# Patient Record
Sex: MALE | Race: BLACK OR AFRICAN AMERICAN | Employment: FULL TIME | URBAN - METROPOLITAN AREA
[De-identification: names, ages, dates, MRNs, and addresses within clinical notes are randomized per-mention and may not be internally consistent; named-entity substitution may affect disease eponyms.]

---

## 2017-05-18 ENCOUNTER — TELEPHONE (OUTPATIENT)
Dept: GASTROENTEROLOGY | Facility: CLINIC | Age: 52
End: 2017-05-18

## 2017-05-18 ENCOUNTER — OFFICE VISIT (OUTPATIENT)
Dept: GASTROENTEROLOGY | Facility: CLINIC | Age: 52
End: 2017-05-18

## 2017-05-18 VITALS
DIASTOLIC BLOOD PRESSURE: 82 MMHG | RESPIRATION RATE: 16 BRPM | HEIGHT: 74 IN | WEIGHT: 260 LBS | TEMPERATURE: 99 F | HEART RATE: 67 BPM | BODY MASS INDEX: 33.37 KG/M2 | SYSTOLIC BLOOD PRESSURE: 130 MMHG

## 2017-05-18 DIAGNOSIS — Z12.11 COLON CANCER SCREENING: Primary | ICD-10-CM

## 2017-05-18 PROCEDURE — 99213 OFFICE O/P EST LOW 20 MIN: CPT | Performed by: INTERNAL MEDICINE

## 2017-05-18 PROCEDURE — 99212 OFFICE O/P EST SF 10 MIN: CPT | Performed by: INTERNAL MEDICINE

## 2017-05-18 NOTE — TELEPHONE ENCOUNTER
Scheduled for:  Colonoscopy 08047  Provider Name:  Date:  6/24/17  Location:  [facility]  Sedation:  Iv Sedation  Time:  0900Am Will Hurry 0800 Am  Prep:Suprep  Meds/Allergies Reconciled?:  Nkda  Diagnosis with codes:  Colon Cancer Screening Z12.11  Was pat

## 2017-05-18 NOTE — PROGRESS NOTES
Paulette Tay is a 45year old male. HPI:   Patient presents with:  Colonoscopy Screening: Patient present for colonoscopy consult. Patient denies any GI problems or pain. Denies family history of colon cancer.      The patient is a 49-year-old male who p murmur or gallop  Abdomen- Soft and nontender, nondistended, no scars, no organosplenomegly  Ext- no clubbing or cyanosis  Skin- no rashes or lesions  Neuro- appropriate response, alert, no confusion  .   ASSESSMENT/PLAN:   Assessment  Colon screening    Th

## 2017-06-10 ENCOUNTER — OFFICE VISIT (OUTPATIENT)
Dept: FAMILY MEDICINE CLINIC | Facility: CLINIC | Age: 52
End: 2017-06-10

## 2017-06-10 VITALS
RESPIRATION RATE: 18 BRPM | SYSTOLIC BLOOD PRESSURE: 119 MMHG | HEART RATE: 51 BPM | DIASTOLIC BLOOD PRESSURE: 68 MMHG | BODY MASS INDEX: 34 KG/M2 | TEMPERATURE: 98 F | WEIGHT: 261 LBS

## 2017-06-10 DIAGNOSIS — Z00.00 ROUTINE GENERAL MEDICAL EXAMINATION AT A HEALTH CARE FACILITY: ICD-10-CM

## 2017-06-10 DIAGNOSIS — I10 ESSENTIAL HYPERTENSION WITH GOAL BLOOD PRESSURE LESS THAN 130/85: Primary | ICD-10-CM

## 2017-06-10 PROCEDURE — 99396 PREV VISIT EST AGE 40-64: CPT | Performed by: FAMILY MEDICINE

## 2017-06-10 RX ORDER — LISINOPRIL 30 MG/1
30 TABLET ORAL DAILY
Qty: 30 TABLET | Refills: 5 | Status: SHIPPED | OUTPATIENT
Start: 2017-06-10 | End: 2017-12-14

## 2017-06-10 NOTE — PROGRESS NOTES
HPI:    Patient ID: Jessa Blackmon is a 45year old male. HPI  Patient presents with:  Hypertension    Review of Systems   Constitutional: Positive for fatigue. Respiratory: Negative. Cardiovascular: Negative.              Current Outpatient Prescript

## 2017-06-14 ENCOUNTER — LAB ENCOUNTER (OUTPATIENT)
Dept: LAB | Age: 52
End: 2017-06-14
Attending: FAMILY MEDICINE
Payer: COMMERCIAL

## 2017-06-14 DIAGNOSIS — I10 ESSENTIAL HYPERTENSION WITH GOAL BLOOD PRESSURE LESS THAN 130/85: ICD-10-CM

## 2017-06-14 PROCEDURE — 36415 COLL VENOUS BLD VENIPUNCTURE: CPT

## 2017-06-14 PROCEDURE — 80061 LIPID PANEL: CPT

## 2017-06-14 PROCEDURE — 80053 COMPREHEN METABOLIC PANEL: CPT

## 2017-06-14 PROCEDURE — 85025 COMPLETE CBC W/AUTO DIFF WBC: CPT

## 2017-06-24 ENCOUNTER — HOSPITAL ENCOUNTER (OUTPATIENT)
Facility: HOSPITAL | Age: 52
Setting detail: HOSPITAL OUTPATIENT SURGERY
Discharge: HOME | End: 2017-06-24
Attending: INTERNAL MEDICINE | Admitting: INTERNAL MEDICINE
Payer: COMMERCIAL

## 2017-06-24 ENCOUNTER — SURGERY (OUTPATIENT)
Age: 52
End: 2017-06-24

## 2017-06-24 VITALS
HEART RATE: 47 BPM | DIASTOLIC BLOOD PRESSURE: 67 MMHG | RESPIRATION RATE: 17 BRPM | OXYGEN SATURATION: 99 % | BODY MASS INDEX: 33.37 KG/M2 | SYSTOLIC BLOOD PRESSURE: 120 MMHG | HEIGHT: 74 IN | WEIGHT: 260 LBS

## 2017-06-24 PROCEDURE — 0DJD8ZZ INSPECTION OF LOWER INTESTINAL TRACT, VIA NATURAL OR ARTIFICIAL OPENING ENDOSCOPIC: ICD-10-PCS | Performed by: INTERNAL MEDICINE

## 2017-06-24 PROCEDURE — 99152 MOD SED SAME PHYS/QHP 5/>YRS: CPT | Performed by: INTERNAL MEDICINE

## 2017-06-24 PROCEDURE — 45378 DIAGNOSTIC COLONOSCOPY: CPT | Performed by: INTERNAL MEDICINE

## 2017-06-24 RX ORDER — MIDAZOLAM HYDROCHLORIDE 1 MG/ML
INJECTION INTRAMUSCULAR; INTRAVENOUS
Status: DISCONTINUED | OUTPATIENT
Start: 2017-06-24 | End: 2017-06-26

## 2017-06-24 RX ORDER — SODIUM CHLORIDE 0.9 % (FLUSH) 0.9 %
10 SYRINGE (ML) INJECTION AS NEEDED
Status: DISCONTINUED | OUTPATIENT
Start: 2017-06-24 | End: 2017-06-26

## 2017-06-24 RX ORDER — SODIUM CHLORIDE, SODIUM LACTATE, POTASSIUM CHLORIDE, CALCIUM CHLORIDE 600; 310; 30; 20 MG/100ML; MG/100ML; MG/100ML; MG/100ML
INJECTION, SOLUTION INTRAVENOUS CONTINUOUS
Status: DISCONTINUED | OUTPATIENT
Start: 2017-06-24 | End: 2017-06-26

## 2017-06-24 RX ORDER — MIDAZOLAM HYDROCHLORIDE 1 MG/ML
1 INJECTION INTRAMUSCULAR; INTRAVENOUS EVERY 5 MIN PRN
Status: DISCONTINUED | OUTPATIENT
Start: 2017-06-24 | End: 2017-06-26

## 2017-06-24 NOTE — H&P
History & Physical Examination    Patient Name: Carol Gutierrez  MRN: [id number]  [facility]: [id number]  YOB: 1965    Diagnosis: colon screening        Prescriptions Prior to Admission:  lisinopril 30 MG Oral Tab Take 1 tablet (30 mg total) by mouth d

## 2017-06-24 NOTE — OPERATIVE REPORT
[facility] HOSP - [facility] Endoscopy Report      Preoperative Diagnosis:  - colon cancer screening      Postoperative Diagnosis:  - diverticulosis  - internal hemorrhoids      Procedure:    Colonoscopy       Surgeon:  Cherrie Coffman M.D.     Anesthesia:  Fe

## 2017-08-23 ENCOUNTER — OFFICE VISIT (OUTPATIENT)
Dept: SURGERY | Facility: CLINIC | Age: 52
End: 2017-08-23

## 2017-08-23 VITALS
HEIGHT: 74 IN | SYSTOLIC BLOOD PRESSURE: 143 MMHG | HEART RATE: 55 BPM | DIASTOLIC BLOOD PRESSURE: 87 MMHG | BODY MASS INDEX: 33.37 KG/M2 | TEMPERATURE: 98 F | WEIGHT: 260 LBS

## 2017-08-23 DIAGNOSIS — R97.20 ELEVATED PSA: Primary | ICD-10-CM

## 2017-08-23 PROCEDURE — 99244 OFF/OP CNSLTJ NEW/EST MOD 40: CPT | Performed by: UROLOGY

## 2017-08-23 PROCEDURE — 99212 OFFICE O/P EST SF 10 MIN: CPT | Performed by: UROLOGY

## 2017-08-23 NOTE — PROGRESS NOTES
SUBJECTIVE:  Genaro Fleischer is a 45year old male who presents for a consultation at the request of, and a copy of this note will be sent to, Dr. Faith Shea, for evaluation of  elevated PSA. He states that the problem is unchanged.  Symptoms include noted to hav 98.3 °F (36.8 °C) (Oral)   Ht 6' 2\" (1.88 m)   Wt 260 lb (117.9 kg)   BMI 33.38 kg/m² no chest wall deformities or tenderness  He appears well, in no apparent distress. Alert and oriented times three, pleasant and cooperative.   CARDIOVASCULAR:normal apic

## 2017-12-14 ENCOUNTER — OFFICE VISIT (OUTPATIENT)
Dept: FAMILY MEDICINE CLINIC | Facility: CLINIC | Age: 52
End: 2017-12-14

## 2017-12-14 VITALS
SYSTOLIC BLOOD PRESSURE: 124 MMHG | DIASTOLIC BLOOD PRESSURE: 71 MMHG | BODY MASS INDEX: 34 KG/M2 | HEART RATE: 49 BPM | WEIGHT: 267 LBS

## 2017-12-14 DIAGNOSIS — I10 ESSENTIAL HYPERTENSION WITH GOAL BLOOD PRESSURE LESS THAN 130/85: Primary | ICD-10-CM

## 2017-12-14 PROCEDURE — 99212 OFFICE O/P EST SF 10 MIN: CPT | Performed by: FAMILY MEDICINE

## 2017-12-14 PROCEDURE — 99213 OFFICE O/P EST LOW 20 MIN: CPT | Performed by: FAMILY MEDICINE

## 2017-12-14 RX ORDER — LISINOPRIL 30 MG/1
30 TABLET ORAL DAILY
Qty: 30 TABLET | Refills: 6 | Status: SHIPPED | OUTPATIENT
Start: 2017-12-14 | End: 2018-06-14

## 2017-12-14 RX ORDER — LISINOPRIL 30 MG/1
TABLET ORAL
Qty: 30 TABLET | Refills: 0 | Status: CANCELLED | OUTPATIENT
Start: 2017-12-14

## 2017-12-14 NOTE — PROGRESS NOTES
HPI:    Patient ID: Genaro Fleischer is a 45year old male. HPI  Patient presents with:  Hypertension: 6 month f/u on medication    Review of Systems   Constitutional: Negative. Respiratory: Negative. Cardiovascular: Negative.     Neurological: Negativ

## 2018-06-14 ENCOUNTER — OFFICE VISIT (OUTPATIENT)
Dept: FAMILY MEDICINE CLINIC | Facility: CLINIC | Age: 53
End: 2018-06-14

## 2018-06-14 VITALS
DIASTOLIC BLOOD PRESSURE: 81 MMHG | SYSTOLIC BLOOD PRESSURE: 144 MMHG | BODY MASS INDEX: 33 KG/M2 | HEART RATE: 58 BPM | WEIGHT: 256 LBS

## 2018-06-14 DIAGNOSIS — I10 ESSENTIAL HYPERTENSION WITH GOAL BLOOD PRESSURE LESS THAN 130/85: Primary | ICD-10-CM

## 2018-06-14 DIAGNOSIS — Z12.5 PROSTATE CANCER SCREENING: ICD-10-CM

## 2018-06-14 PROCEDURE — 99214 OFFICE O/P EST MOD 30 MIN: CPT | Performed by: FAMILY MEDICINE

## 2018-06-14 PROCEDURE — 99212 OFFICE O/P EST SF 10 MIN: CPT | Performed by: FAMILY MEDICINE

## 2018-06-14 RX ORDER — PENICILLIN V POTASSIUM 500 MG/1
TABLET ORAL
Refills: 0 | COMMUNITY
Start: 2018-06-05 | End: 2018-10-01

## 2018-06-14 RX ORDER — LISINOPRIL 30 MG/1
30 TABLET ORAL DAILY
Qty: 30 TABLET | Refills: 6 | Status: SHIPPED | OUTPATIENT
Start: 2018-06-14 | End: 2019-01-19

## 2018-06-14 NOTE — PROGRESS NOTES
HPI:    Patient ID: Bekah Wallace is a 52year old male. HPI  Patient presents with:  Hypertension: 6 month f/u on medication and refills    Review of Systems   Constitutional: Negative. Respiratory: Negative. Cardiovascular: Negative.     [place]

## 2018-07-24 ENCOUNTER — LAB ENCOUNTER (OUTPATIENT)
Dept: LAB | Age: 53
End: 2018-07-24
Attending: FAMILY MEDICINE
Payer: COMMERCIAL

## 2018-07-24 DIAGNOSIS — I10 ESSENTIAL HYPERTENSION WITH GOAL BLOOD PRESSURE LESS THAN 130/85: ICD-10-CM

## 2018-07-24 DIAGNOSIS — Z12.5 PROSTATE CANCER SCREENING: ICD-10-CM

## 2018-07-24 DIAGNOSIS — R97.20 ELEVATED PSA: ICD-10-CM

## 2018-07-24 LAB
ALBUMIN SERPL BCP-MCNC: 4 G/DL (ref 3.5–4.8)
ALBUMIN/GLOB SERPL: 1.2 {RATIO} (ref 1–2)
ALP SERPL-CCNC: 39 U/L (ref 32–100)
ALT SERPL-CCNC: 18 U/L (ref 17–63)
ANION GAP SERPL CALC-SCNC: 9 MMOL/L (ref 0–18)
AST SERPL-CCNC: 40 U/L (ref 15–41)
BACTERIA UR QL AUTO: NEGATIVE /HPF
BASOPHILS # BLD: 0 K/UL (ref 0–0.2)
BASOPHILS NFR BLD: 0 %
BILIRUB SERPL-MCNC: 1.4 MG/DL (ref 0.3–1.2)
BILIRUB UR QL: NEGATIVE
BUN SERPL-MCNC: 18 MG/DL (ref 8–20)
BUN/CREAT SERPL: 12.7 (ref 10–20)
CALCIUM SERPL-MCNC: 9.8 MG/DL (ref 8.5–10.5)
CHLORIDE SERPL-SCNC: 106 MMOL/L (ref 95–110)
CHOLEST SERPL-MCNC: 201 MG/DL (ref 110–200)
CLARITY UR: CLEAR
CO2 SERPL-SCNC: 25 MMOL/L (ref 22–32)
COLOR UR: YELLOW
CREAT SERPL-MCNC: 1.42 MG/DL (ref 0.5–1.5)
EOSINOPHIL # BLD: 0 K/UL (ref 0–0.7)
EOSINOPHIL NFR BLD: 1 %
ERYTHROCYTE [DISTWIDTH] IN BLOOD BY AUTOMATED COUNT: 13.6 % (ref 11–15)
GLOBULIN PLAS-MCNC: 3.3 G/DL (ref 2.5–3.7)
GLUCOSE SERPL-MCNC: 80 MG/DL (ref 70–99)
GLUCOSE UR-MCNC: NEGATIVE MG/DL
HCT VFR BLD AUTO: 41.6 % (ref 41–52)
HDLC SERPL-MCNC: 53 MG/DL
HGB BLD-MCNC: 13.2 G/DL (ref 13.5–17.5)
HGB UR QL STRIP.AUTO: NEGATIVE
KETONES UR-MCNC: NEGATIVE MG/DL
LDLC SERPL CALC-MCNC: 125 MG/DL (ref 0–99)
LEUKOCYTE ESTERASE UR QL STRIP.AUTO: NEGATIVE
LYMPHOCYTES # BLD: 1.5 K/UL (ref 1–4)
LYMPHOCYTES NFR BLD: 40 %
MCH RBC QN AUTO: 28.1 PG (ref 27–32)
MCHC RBC AUTO-ENTMCNC: 31.9 G/DL (ref 32–37)
MCV RBC AUTO: 88.3 FL (ref 80–100)
MONOCYTES # BLD: 0.2 K/UL (ref 0–1)
MONOCYTES NFR BLD: 6 %
NEUTROPHILS # BLD AUTO: 2 K/UL (ref 1.8–7.7)
NEUTROPHILS NFR BLD: 54 %
NITRITE UR QL STRIP.AUTO: NEGATIVE
NONHDLC SERPL-MCNC: 148 MG/DL
OSMOLALITY UR CALC.SUM OF ELEC: 291 MOSM/KG (ref 275–295)
PATIENT FASTING: YES
PH UR: 5 [PH] (ref 5–8)
PLATELET # BLD AUTO: 192 K/UL (ref 140–400)
PMV BLD AUTO: 9.5 FL (ref 7.4–10.3)
POTASSIUM SERPL-SCNC: 4.4 MMOL/L (ref 3.3–5.1)
PROT SERPL-MCNC: 7.3 G/DL (ref 5.9–8.4)
PROT UR-MCNC: 30 MG/DL
PSA SERPL-MCNC: 4 NG/ML (ref 0–4)
PSA SERPL-MCNC: 4 NG/ML (ref 0–4)
RBC # BLD AUTO: 4.71 M/UL (ref 4.5–5.9)
RBC #/AREA URNS AUTO: 2 /HPF
SODIUM SERPL-SCNC: 140 MMOL/L (ref 136–144)
SP GR UR STRIP: 1.02 (ref 1–1.03)
TRIGL SERPL-MCNC: 116 MG/DL (ref 1–149)
UROBILINOGEN UR STRIP-ACNC: 2
VIT C UR-MCNC: 40 MG/DL
WBC # BLD AUTO: 3.7 K/UL (ref 4–11)
WBC #/AREA URNS AUTO: <1 /HPF

## 2018-07-24 PROCEDURE — 85025 COMPLETE CBC W/AUTO DIFF WBC: CPT

## 2018-07-24 PROCEDURE — 80061 LIPID PANEL: CPT

## 2018-07-24 PROCEDURE — 36415 COLL VENOUS BLD VENIPUNCTURE: CPT

## 2018-07-24 PROCEDURE — 81001 URINALYSIS AUTO W/SCOPE: CPT

## 2018-07-24 PROCEDURE — 84153 ASSAY OF PSA TOTAL: CPT

## 2018-07-24 PROCEDURE — 80053 COMPREHEN METABOLIC PANEL: CPT

## 2018-10-01 ENCOUNTER — HOSPITAL ENCOUNTER (OUTPATIENT)
Age: 53
Discharge: HOME | End: 2018-10-01
Payer: COMMERCIAL

## 2018-10-01 ENCOUNTER — APPOINTMENT (OUTPATIENT)
Dept: GENERAL RADIOLOGY | Age: 53
End: 2018-10-01
Attending: NURSE PRACTITIONER
Payer: COMMERCIAL

## 2018-10-01 VITALS
TEMPERATURE: 99 F | HEART RATE: 63 BPM | BODY MASS INDEX: 33 KG/M2 | OXYGEN SATURATION: 98 % | WEIGHT: 260 LBS | SYSTOLIC BLOOD PRESSURE: 156 MMHG | RESPIRATION RATE: 20 BRPM | DIASTOLIC BLOOD PRESSURE: 97 MMHG

## 2018-10-01 DIAGNOSIS — L03.90 CELLULITIS, UNSPECIFIED CELLULITIS SITE: Primary | ICD-10-CM

## 2018-10-01 PROCEDURE — 99213 OFFICE O/P EST LOW 20 MIN: CPT

## 2018-10-01 PROCEDURE — 73660 X-RAY EXAM OF TOE(S): CPT | Performed by: NURSE PRACTITIONER

## 2018-10-01 PROCEDURE — 99204 OFFICE O/P NEW MOD 45 MIN: CPT

## 2018-10-01 RX ORDER — CEPHALEXIN 500 MG/1
500 CAPSULE ORAL 4 TIMES DAILY
Qty: 40 CAPSULE | Refills: 0 | Status: SHIPPED | OUTPATIENT
Start: 2018-10-01 | End: 2018-10-11

## 2018-10-01 NOTE — ED INITIAL ASSESSMENT (HPI)
Left foot pain and swelling for one week on the medial side of his foot by the great toe.  +swelling  +redness  +pain    Pt is a  and wears steel toe shoes while working.

## 2018-10-01 NOTE — ED PROVIDER NOTES
Patient presents with: Foot Pain: pt complaining of left foot pain and swelling      HPI:     Jessa Blackmon is a 52year old male with a past history of hypertension presents with left medial great toe pain over the course the last week.   Patient reports h metatarsal phalangeal joint. Moderate soft tissue swelling medial to the left first metatarsal phalangeal joint. Correlate clinically. Can't exclude an  inflammatory process.      Dictated by (CST): Sara Blackburn MD on 10/01/2018 at 12:47     Approved by (

## 2018-10-03 ENCOUNTER — OFFICE VISIT (OUTPATIENT)
Dept: FAMILY MEDICINE CLINIC | Facility: CLINIC | Age: 53
End: 2018-10-03
Payer: COMMERCIAL

## 2018-10-03 VITALS
HEIGHT: 74 IN | DIASTOLIC BLOOD PRESSURE: 98 MMHG | HEART RATE: 46 BPM | WEIGHT: 260 LBS | TEMPERATURE: 99 F | BODY MASS INDEX: 33.37 KG/M2 | SYSTOLIC BLOOD PRESSURE: 156 MMHG

## 2018-10-03 DIAGNOSIS — M25.579 ARTHRALGIA OF FOOT, UNSPECIFIED LATERALITY: Primary | ICD-10-CM

## 2018-10-03 PROCEDURE — 99212 OFFICE O/P EST SF 10 MIN: CPT | Performed by: FAMILY MEDICINE

## 2018-10-03 PROCEDURE — 99213 OFFICE O/P EST LOW 20 MIN: CPT | Performed by: FAMILY MEDICINE

## 2018-10-04 ENCOUNTER — TELEPHONE (OUTPATIENT)
Dept: FAMILY MEDICINE CLINIC | Facility: CLINIC | Age: 53
End: 2018-10-04

## 2018-10-04 ENCOUNTER — OFFICE VISIT (OUTPATIENT)
Dept: FAMILY MEDICINE CLINIC | Facility: CLINIC | Age: 53
End: 2018-10-04
Payer: COMMERCIAL

## 2018-10-04 VITALS
WEIGHT: 260 LBS | BODY MASS INDEX: 33.37 KG/M2 | SYSTOLIC BLOOD PRESSURE: 158 MMHG | HEIGHT: 74 IN | HEART RATE: 60 BPM | DIASTOLIC BLOOD PRESSURE: 84 MMHG | TEMPERATURE: 98 F

## 2018-10-04 DIAGNOSIS — L03.119 CELLULITIS AND ABSCESS OF FOOT: Primary | ICD-10-CM

## 2018-10-04 DIAGNOSIS — L02.619 CELLULITIS AND ABSCESS OF FOOT: Primary | ICD-10-CM

## 2018-10-04 PROCEDURE — 99214 OFFICE O/P EST MOD 30 MIN: CPT | Performed by: FAMILY MEDICINE

## 2018-10-04 PROCEDURE — 99212 OFFICE O/P EST SF 10 MIN: CPT | Performed by: FAMILY MEDICINE

## 2018-10-04 NOTE — PROGRESS NOTES
HPI:    Patient ID: Elizabet Lua is a 52year old male. Here for f/u UC. Was started with keflex for presumed cellulitis. Started with severe pain in the 1st MTP joint around a week ago. No fever. Pain was severe now much better.  Does not remember t

## 2018-10-04 NOTE — PROGRESS NOTES
HPI:    Patient ID: Xenia Dozier is a 52year old male. HPI  Patient presents with:  Complete Form: FMLA  left foot cellulitis holding him off work during treatment . Painful. Review of Systems   Constitutional: Negative.     Musculoskeletal: Positiv

## 2018-10-06 NOTE — TELEPHONE ENCOUNTER
FMLA form for Dr. Rosalie Avendaño received in [employer] Signed release, pt paid $25 with . Logged for processing.  NK

## 2018-11-23 NOTE — TELEPHONE ENCOUNTER
Left message 10/24, 11/22 for patient who has not returned my call . Need specific dates he was off before I can complete.  SC

## 2019-01-19 RX ORDER — LISINOPRIL 30 MG/1
TABLET ORAL
Qty: 30 TABLET | Refills: 0 | Status: SHIPPED | OUTPATIENT
Start: 2019-01-19 | End: 2019-02-15

## 2019-02-15 RX ORDER — LISINOPRIL 30 MG/1
TABLET ORAL
Qty: 30 TABLET | Refills: 2 | Status: SHIPPED | OUTPATIENT
Start: 2019-02-15 | End: 2019-05-24

## 2019-05-24 ENCOUNTER — OFFICE VISIT (OUTPATIENT)
Dept: FAMILY MEDICINE CLINIC | Facility: CLINIC | Age: 54
End: 2019-05-24
Payer: COMMERCIAL

## 2019-05-24 VITALS
DIASTOLIC BLOOD PRESSURE: 70 MMHG | HEART RATE: 61 BPM | HEIGHT: 74 IN | WEIGHT: 267 LBS | SYSTOLIC BLOOD PRESSURE: 118 MMHG | BODY MASS INDEX: 34.27 KG/M2 | TEMPERATURE: 98 F

## 2019-05-24 DIAGNOSIS — Z00.00 ROUTINE GENERAL MEDICAL EXAMINATION AT A HEALTH CARE FACILITY: Primary | ICD-10-CM

## 2019-05-24 DIAGNOSIS — R97.20 ELEVATED PSA: ICD-10-CM

## 2019-05-24 PROCEDURE — 99396 PREV VISIT EST AGE 40-64: CPT | Performed by: FAMILY MEDICINE

## 2019-05-24 RX ORDER — LISINOPRIL 30 MG/1
TABLET ORAL
Qty: 30 TABLET | Refills: 11 | Status: SHIPPED | OUTPATIENT
Start: 2019-05-24 | End: 2020-05-12

## 2019-05-24 RX ORDER — ALFUZOSIN HYDROCHLORIDE 10 MG/1
10 TABLET, EXTENDED RELEASE ORAL DAILY
Qty: 30 TABLET | Refills: 5 | Status: SHIPPED | OUTPATIENT
Start: 2019-05-24 | End: 2019-11-25

## 2019-05-24 NOTE — PROGRESS NOTES
HPI:    Patient ID: Ron Dawn is a 62year old male.     HPI  Patient presents with:  Physical: annual physical     Past Medical History:   Diagnosis Date   • High blood pressure    • Unspecified essential hypertension      Review of Systems   Constituti Radial pulses are 2+ on the right side, and 2+ on the left side. Pulmonary/Chest: Breath sounds normal.   Abdominal: Normal appearance. There is no hepatosplenomegaly. There is no tenderness. There is no CVA tenderness.    Lymphadenopathy:     He has no

## 2019-09-12 ENCOUNTER — LAB ENCOUNTER (OUTPATIENT)
Dept: LAB | Age: 54
End: 2019-09-12
Attending: FAMILY MEDICINE
Payer: COMMERCIAL

## 2019-09-12 DIAGNOSIS — Z00.00 ROUTINE GENERAL MEDICAL EXAMINATION AT A HEALTH CARE FACILITY: ICD-10-CM

## 2019-09-12 LAB
ALBUMIN SERPL-MCNC: 3.5 G/DL (ref 3.4–5)
ALBUMIN/GLOB SERPL: 0.9 {RATIO} (ref 1–2)
ALP LIVER SERPL-CCNC: 47 U/L (ref 45–117)
ALT SERPL-CCNC: 16 U/L (ref 16–61)
ANION GAP SERPL CALC-SCNC: 4 MMOL/L (ref 0–18)
AST SERPL-CCNC: 19 U/L (ref 15–37)
BASOPHILS # BLD AUTO: 0.02 X10(3) UL (ref 0–0.2)
BASOPHILS NFR BLD AUTO: 0.5 %
BILIRUB SERPL-MCNC: 0.7 MG/DL (ref 0.1–2)
BUN BLD-MCNC: 25 MG/DL (ref 7–18)
BUN/CREAT SERPL: 17.2 (ref 10–20)
CALCIUM BLD-MCNC: 9.2 MG/DL (ref 8.5–10.1)
CHLORIDE SERPL-SCNC: 112 MMOL/L (ref 98–112)
CHOLEST SMN-MCNC: 185 MG/DL (ref ?–200)
CO2 SERPL-SCNC: 29 MMOL/L (ref 21–32)
COMPLEXED PSA SERPL-MCNC: 7.32 NG/ML (ref ?–4)
CREAT BLD-MCNC: 1.45 MG/DL (ref 0.7–1.3)
DEPRECATED RDW RBC AUTO: 42.2 FL (ref 35.1–46.3)
EOSINOPHIL # BLD AUTO: 0.01 X10(3) UL (ref 0–0.7)
EOSINOPHIL NFR BLD AUTO: 0.3 %
ERYTHROCYTE [DISTWIDTH] IN BLOOD BY AUTOMATED COUNT: 12.7 % (ref 11–15)
GLOBULIN PLAS-MCNC: 4 G/DL (ref 2.8–4.4)
GLUCOSE BLD-MCNC: 97 MG/DL (ref 70–99)
HCT VFR BLD AUTO: 43.8 % (ref 39–53)
HDLC SERPL-MCNC: 55 MG/DL (ref 40–59)
HGB BLD-MCNC: 13.4 G/DL (ref 13–17.5)
IMM GRANULOCYTES # BLD AUTO: 0.01 X10(3) UL (ref 0–1)
IMM GRANULOCYTES NFR BLD: 0.3 %
LDLC SERPL CALC-MCNC: 113 MG/DL (ref ?–100)
LYMPHOCYTES # BLD AUTO: 1.37 X10(3) UL (ref 1–4)
LYMPHOCYTES NFR BLD AUTO: 37.2 %
M PROTEIN MFR SERPL ELPH: 7.5 G/DL (ref 6.4–8.2)
MCH RBC QN AUTO: 27.8 PG (ref 26–34)
MCHC RBC AUTO-ENTMCNC: 30.6 G/DL (ref 31–37)
MCV RBC AUTO: 90.9 FL (ref 80–100)
MONOCYTES # BLD AUTO: 0.21 X10(3) UL (ref 0.1–1)
MONOCYTES NFR BLD AUTO: 5.7 %
NEUTROPHILS # BLD AUTO: 2.06 X10 (3) UL (ref 1.5–7.7)
NEUTROPHILS # BLD AUTO: 2.06 X10(3) UL (ref 1.5–7.7)
NEUTROPHILS NFR BLD AUTO: 56 %
NONHDLC SERPL-MCNC: 130 MG/DL (ref ?–130)
OSMOLALITY SERPL CALC.SUM OF ELEC: 304 MOSM/KG (ref 275–295)
PATIENT FASTING: YES
PATIENT FASTING: YES
PLATELET # BLD AUTO: 217 10(3)UL (ref 150–450)
POTASSIUM SERPL-SCNC: 4.8 MMOL/L (ref 3.5–5.1)
RBC # BLD AUTO: 4.82 X10(6)UL (ref 4.3–5.7)
SODIUM SERPL-SCNC: 145 MMOL/L (ref 136–145)
TRIGL SERPL-MCNC: 87 MG/DL (ref 30–149)
VLDLC SERPL CALC-MCNC: 17 MG/DL (ref 0–30)
WBC # BLD AUTO: 3.7 X10(3) UL (ref 4–11)

## 2019-09-12 PROCEDURE — 80053 COMPREHEN METABOLIC PANEL: CPT

## 2019-09-12 PROCEDURE — 36415 COLL VENOUS BLD VENIPUNCTURE: CPT

## 2019-09-12 PROCEDURE — 80061 LIPID PANEL: CPT

## 2019-09-12 PROCEDURE — 85025 COMPLETE CBC W/AUTO DIFF WBC: CPT

## 2019-09-23 DIAGNOSIS — Z00.00 ROUTINE GENERAL MEDICAL EXAMINATION AT A HEALTH CARE FACILITY: Primary | ICD-10-CM

## 2019-11-26 RX ORDER — ALFUZOSIN HYDROCHLORIDE 10 MG/1
TABLET, EXTENDED RELEASE ORAL
Qty: 90 TABLET | Refills: 1 | Status: SHIPPED | OUTPATIENT
Start: 2019-11-26 | End: 2020-05-12

## 2019-11-26 NOTE — TELEPHONE ENCOUNTER
Review pended refill request as it does not fall under a protocol.     Last Rx: 5/24/19  LOV: 6 months ago

## 2019-12-09 ENCOUNTER — OFFICE VISIT (OUTPATIENT)
Dept: SURGERY | Facility: CLINIC | Age: 54
End: 2019-12-09
Payer: COMMERCIAL

## 2019-12-09 ENCOUNTER — TELEPHONE (OUTPATIENT)
Dept: SURGERY | Facility: CLINIC | Age: 54
End: 2019-12-09

## 2019-12-09 VITALS
DIASTOLIC BLOOD PRESSURE: 75 MMHG | SYSTOLIC BLOOD PRESSURE: 123 MMHG | TEMPERATURE: 98 F | HEART RATE: 62 BPM | BODY MASS INDEX: 34 KG/M2 | WEIGHT: 262 LBS

## 2019-12-09 DIAGNOSIS — R97.20 ELEVATED PSA: Primary | ICD-10-CM

## 2019-12-09 PROCEDURE — 99214 OFFICE O/P EST MOD 30 MIN: CPT | Performed by: UROLOGY

## 2019-12-09 RX ORDER — CEFDINIR 300 MG/1
300 CAPSULE ORAL EVERY 12 HOURS
Qty: 6 CAPSULE | Refills: 0 | Status: SHIPPED | OUTPATIENT
Start: 2019-12-09 | End: 2019-12-12

## 2019-12-09 RX ORDER — CIPROFLOXACIN 500 MG/1
500 TABLET, FILM COATED ORAL 2 TIMES DAILY
Qty: 6 TABLET | Refills: 0 | Status: SHIPPED | OUTPATIENT
Start: 2019-12-09 | End: 2020-05-12 | Stop reason: ALTCHOICE

## 2019-12-09 NOTE — PROGRESS NOTES
Estefani Jones is a 62year old male. HPI:   Patient presents with:  elevated psa: PT presents for follow up to discuss elevated psa. PT denies new issues or complaints.        49-year-old male -American with chronic history of elevated serum PSA mo but definitely more firm than previously in 2017. ASSESSMENT/PLAN:   Assessment   Elevated psa  (primary encounter diagnosis)    Reviewed findings at length with patient.   I strongly recommended proceeding with transrectal ultrasound and prostate biops

## 2019-12-09 NOTE — TELEPHONE ENCOUNTER
Patient seen in office today. Scheduled patient for prostate biopsy. Went over prostate biopsy instructions, patient verbalized understanding. Patient signed instructions sheet. Copy sent to scanning.        Future Appointments   Date Time Provider Departme

## 2020-01-24 ENCOUNTER — OFFICE VISIT (OUTPATIENT)
Dept: SURGERY | Facility: CLINIC | Age: 55
End: 2020-01-24
Payer: COMMERCIAL

## 2020-01-24 VITALS
HEIGHT: 74 IN | DIASTOLIC BLOOD PRESSURE: 80 MMHG | HEART RATE: 52 BPM | WEIGHT: 259 LBS | TEMPERATURE: 99 F | RESPIRATION RATE: 16 BRPM | BODY MASS INDEX: 33.24 KG/M2 | SYSTOLIC BLOOD PRESSURE: 132 MMHG | OXYGEN SATURATION: 98 %

## 2020-01-24 DIAGNOSIS — R97.20 ELEVATED PSA: Primary | ICD-10-CM

## 2020-01-24 PROCEDURE — 55700 BIOPSY OF PROSTATE,NEEDLE/PUNCH: CPT | Performed by: UROLOGY

## 2020-01-24 PROCEDURE — 76942 ECHO GUIDE FOR BIOPSY: CPT | Performed by: UROLOGY

## 2020-01-24 NOTE — PROGRESS NOTES
Genaro Fleischer is a 60year old male. HPI:   Patient presents with:  elevated psa: transrectal ultrasound with prostate biopsies       57-year-old male presents for transrectal ultrasound and prostate biopsy.   He was last seen in the office December 9, 20 obtained: 12  Areas biopsied: Right and left apex, mid and bases at mid and lateral areas each.    Complications: None  Blood loss: Minimal         ASSESSMENT/PLAN:   Assessment   Elevated psa  (primary encounter diagnosis)    Follow-up in 2 weeks to review

## 2020-01-30 ENCOUNTER — TELEPHONE (OUTPATIENT)
Dept: SURGERY | Facility: CLINIC | Age: 55
End: 2020-01-30

## 2020-01-30 NOTE — TELEPHONE ENCOUNTER
Patient contacted. Patient is aware of his prostate biopsy results and verbalized understanding. Patient states he will f/u as planned.

## 2020-01-30 NOTE — TELEPHONE ENCOUNTER
----- Message from Aditya Sidhu MD sent at 1/29/2020 12:54 PM CST -----  Please call this patient and let him know that his prostate biopsy does not show any cancer. He should follow-up as scheduled.

## 2020-02-10 ENCOUNTER — OFFICE VISIT (OUTPATIENT)
Dept: SURGERY | Facility: CLINIC | Age: 55
End: 2020-02-10
Payer: COMMERCIAL

## 2020-02-10 VITALS
TEMPERATURE: 98 F | DIASTOLIC BLOOD PRESSURE: 78 MMHG | WEIGHT: 260 LBS | SYSTOLIC BLOOD PRESSURE: 125 MMHG | HEART RATE: 62 BPM | BODY MASS INDEX: 33 KG/M2

## 2020-02-10 DIAGNOSIS — R97.20 ELEVATED PSA: Primary | ICD-10-CM

## 2020-02-10 PROCEDURE — 99213 OFFICE O/P EST LOW 20 MIN: CPT | Performed by: UROLOGY

## 2020-02-11 ENCOUNTER — TELEPHONE (OUTPATIENT)
Dept: SURGERY | Facility: CLINIC | Age: 55
End: 2020-02-11

## 2020-02-11 NOTE — TELEPHONE ENCOUNTER
Left message on voicemail regarding follow-up change and to call office back for further explanation.

## 2020-02-11 NOTE — TELEPHONE ENCOUNTER
Staff I had seen this patient yesterday in follow-up to a prostate biopsy which was negative. I inadvertently told the patient to follow-up in 1 year.   Please call him and tell him I would like to see him instead September 2020 with a repeat PSA and digit

## 2020-02-11 NOTE — PROGRESS NOTES
Josias Juares is a 60year old male. HPI:   Patient presents with:  elevated psa: PT presents for follow up to discuss bx results.        42-year-old male status post transrectal ultrasound and prostate biopsy performed January 24, 2020 for an elevated PS atrophy. · No evidence of high-grade prostatic intraepithelial neoplasia (HGPIN) or malignancy identified.     F.  Prostate, left apex/left lateral apex; needle biopsy:                · Benign glandular and stromal prostatic tissue demonstrating focal mild results and he is aware. Stressed the importance of continued prostate cancer screening with yearly PSA and digital prostate exam.  Recommended he follow-up 9/2019 for a repeat PSA and digital prostate exam at that time.   Patient understands plan and agre

## 2020-05-06 ENCOUNTER — NURSE TRIAGE (OUTPATIENT)
Dept: FAMILY MEDICINE CLINIC | Facility: CLINIC | Age: 55
End: 2020-05-06

## 2020-05-06 NOTE — TELEPHONE ENCOUNTER
Action Requested: Summary for Provider     []  Critical Lab, Recommendations Needed  [x] Need Additional Advice  []   FYI    []   Need Orders  [] Need Medications Sent to Pharmacy  []  Other     SUMMARY:     Spoke with pt,  verified  Pt stated he is fernie

## 2020-05-12 ENCOUNTER — OFFICE VISIT (OUTPATIENT)
Dept: FAMILY MEDICINE CLINIC | Facility: CLINIC | Age: 55
End: 2020-05-12
Payer: COMMERCIAL

## 2020-05-12 VITALS
TEMPERATURE: 98 F | SYSTOLIC BLOOD PRESSURE: 119 MMHG | HEART RATE: 66 BPM | DIASTOLIC BLOOD PRESSURE: 70 MMHG | WEIGHT: 261 LBS | BODY MASS INDEX: 33.5 KG/M2 | HEIGHT: 74 IN

## 2020-05-12 DIAGNOSIS — R97.20 ELEVATED PSA: ICD-10-CM

## 2020-05-12 DIAGNOSIS — I10 ESSENTIAL HYPERTENSION WITH GOAL BLOOD PRESSURE LESS THAN 130/85: ICD-10-CM

## 2020-05-12 DIAGNOSIS — Z00.00 ROUTINE GENERAL MEDICAL EXAMINATION AT A HEALTH CARE FACILITY: Primary | ICD-10-CM

## 2020-05-12 PROCEDURE — 99396 PREV VISIT EST AGE 40-64: CPT | Performed by: FAMILY MEDICINE

## 2020-05-12 RX ORDER — LISINOPRIL 30 MG/1
TABLET ORAL
Qty: 90 TABLET | Refills: 3 | Status: SHIPPED | OUTPATIENT
Start: 2020-05-12 | End: 2021-06-05

## 2020-05-12 RX ORDER — ALFUZOSIN HYDROCHLORIDE 10 MG/1
10 TABLET, EXTENDED RELEASE ORAL DAILY
Qty: 90 TABLET | Refills: 1 | Status: SHIPPED | OUTPATIENT
Start: 2020-05-12 | End: 2020-11-23

## 2020-05-12 RX ORDER — TADALAFIL 10 MG/1
10 TABLET ORAL
Qty: 6 TABLET | Refills: 1 | Status: SHIPPED | OUTPATIENT
Start: 2020-05-12 | End: 2020-06-25

## 2020-05-12 NOTE — PROGRESS NOTES
HPI:    Patient ID: Xenia Dozier is a 60year old male.     HPI  Patient presents with:  Physical: annual routine physical,     Past Medical History:   Diagnosis Date   • High blood pressure    • Unspecified essential hypertension      Review of Systems   C and no JVD present. Cardiovascular: Regular rhythm and normal heart sounds. Pulses:       Carotid pulses are 2+ on the right side and 2+ on the left side. Radial pulses are 2+ on the right side and 2+ on the left side.    Pulmonary/Chest: Breath s

## 2020-06-02 RX ORDER — LISINOPRIL 30 MG/1
TABLET ORAL
Qty: 30 TABLET | Refills: 0 | OUTPATIENT
Start: 2020-06-02

## 2020-06-03 NOTE — TELEPHONE ENCOUNTER
Called pt LM in regards to med was given for 1 year, give clinic a call in regards to matter of requesting refill

## 2020-06-25 RX ORDER — TADALAFIL 10 MG/1
TABLET ORAL
Qty: 6 TABLET | Refills: 5 | Status: SHIPPED | OUTPATIENT
Start: 2020-06-25 | End: 2022-08-29

## 2020-09-03 ENCOUNTER — LAB ENCOUNTER (OUTPATIENT)
Dept: LAB | Age: 55
End: 2020-09-03
Attending: FAMILY MEDICINE
Payer: COMMERCIAL

## 2020-09-03 DIAGNOSIS — R97.20 ELEVATED PSA: ICD-10-CM

## 2020-09-03 LAB — PSA SERPL-MCNC: 13 NG/ML (ref ?–4)

## 2020-09-03 PROCEDURE — 84153 ASSAY OF PSA TOTAL: CPT

## 2020-09-03 PROCEDURE — 36415 COLL VENOUS BLD VENIPUNCTURE: CPT

## 2020-09-13 ENCOUNTER — TELEPHONE (OUTPATIENT)
Dept: SURGERY | Facility: CLINIC | Age: 55
End: 2020-09-13

## 2020-09-13 NOTE — TELEPHONE ENCOUNTER
GLENN on cell #. I also called pt at the [facility] # listed and s/w him to inform of the results msg and instructions as stated in [facility] msg below and [facility] and I told him that I will cxl his current appt for 9/15 and he should gretel

## 2020-09-21 ENCOUNTER — HOSPITAL ENCOUNTER (OUTPATIENT)
Dept: MRI IMAGING | Facility: HOSPITAL | Age: 55
Discharge: HOME | End: 2020-09-21
Attending: UROLOGY
Payer: COMMERCIAL

## 2020-09-21 DIAGNOSIS — R97.20 ELEVATED PSA: ICD-10-CM

## 2020-09-21 PROCEDURE — 72197 MRI PELVIS W/O & W/DYE: CPT | Performed by: UROLOGY

## 2020-09-21 PROCEDURE — A9575 INJ GADOTERATE MEGLUMI 0.1ML: HCPCS | Performed by: UROLOGY

## 2020-10-07 ENCOUNTER — TELEPHONE (OUTPATIENT)
Dept: SURGERY | Facility: CLINIC | Age: 55
End: 2020-10-07

## 2020-10-07 NOTE — TELEPHONE ENCOUNTER
RN called patient and relayed MD's message and recommendations. He verbalized understanding and agreed to 12/22 Tuesday 350pm appointment with PSA prior. All questions answered.      Below is Dr Camille Sahu message:     Lynnette Maldonado MD   10/7/2020 11:45 AM

## 2020-11-23 RX ORDER — ALFUZOSIN HYDROCHLORIDE 10 MG/1
TABLET, EXTENDED RELEASE ORAL
Qty: 90 TABLET | Refills: 1 | Status: SHIPPED | OUTPATIENT
Start: 2020-11-23

## 2020-12-07 ENCOUNTER — LAB ENCOUNTER (OUTPATIENT)
Dept: LAB | Age: 55
End: 2020-12-07
Attending: UROLOGY
Payer: COMMERCIAL

## 2020-12-07 DIAGNOSIS — R97.20 ELEVATED PSA: ICD-10-CM

## 2020-12-07 PROCEDURE — 84153 ASSAY OF PSA TOTAL: CPT

## 2020-12-07 PROCEDURE — 36415 COLL VENOUS BLD VENIPUNCTURE: CPT

## 2020-12-22 ENCOUNTER — OFFICE VISIT (OUTPATIENT)
Dept: SURGERY | Facility: CLINIC | Age: 55
End: 2020-12-22
Payer: COMMERCIAL

## 2020-12-22 VITALS — DIASTOLIC BLOOD PRESSURE: 73 MMHG | SYSTOLIC BLOOD PRESSURE: 122 MMHG

## 2020-12-22 DIAGNOSIS — R97.20 ELEVATED PSA: Primary | ICD-10-CM

## 2020-12-22 PROCEDURE — 3078F DIAST BP <80 MM HG: CPT | Performed by: UROLOGY

## 2020-12-22 PROCEDURE — 99213 OFFICE O/P EST LOW 20 MIN: CPT | Performed by: UROLOGY

## 2020-12-22 PROCEDURE — 3074F SYST BP LT 130 MM HG: CPT | Performed by: UROLOGY

## 2020-12-22 NOTE — PROGRESS NOTES
Joseluis Arce is a 60year old male. HPI:   Patient presents with:  elevated psa: PT presents for follow up visit.  PT denies new issues or complaints      40-year-old male -American no family history of prostate cancer elevated PSA status post tra (primary encounter diagnosis)    Reviewed findings at length with patient. Recommended follow-up in 2 months with a repeat PSA and digital prostate exam at that time.   He understands that if his PSA continues to be elevated additional prostate biopsies mi

## 2021-02-16 ENCOUNTER — LAB ENCOUNTER (OUTPATIENT)
Dept: LAB | Age: 56
End: 2021-02-16
Attending: UROLOGY
Payer: COMMERCIAL

## 2021-02-16 DIAGNOSIS — R97.20 ELEVATED PSA: ICD-10-CM

## 2021-02-16 LAB — PSA SERPL-MCNC: 18 NG/ML (ref ?–4)

## 2021-02-16 PROCEDURE — 36415 COLL VENOUS BLD VENIPUNCTURE: CPT

## 2021-02-16 PROCEDURE — 84153 ASSAY OF PSA TOTAL: CPT

## 2021-02-23 ENCOUNTER — OFFICE VISIT (OUTPATIENT)
Dept: SURGERY | Facility: CLINIC | Age: 56
End: 2021-02-23
Payer: COMMERCIAL

## 2021-02-23 VITALS — SYSTOLIC BLOOD PRESSURE: 104 MMHG | DIASTOLIC BLOOD PRESSURE: 72 MMHG | HEART RATE: 54 BPM

## 2021-02-23 DIAGNOSIS — R97.20 ELEVATED PSA: Primary | ICD-10-CM

## 2021-02-23 PROCEDURE — 3074F SYST BP LT 130 MM HG: CPT | Performed by: UROLOGY

## 2021-02-23 PROCEDURE — 99213 OFFICE O/P EST LOW 20 MIN: CPT | Performed by: UROLOGY

## 2021-02-23 PROCEDURE — 3078F DIAST BP <80 MM HG: CPT | Performed by: UROLOGY

## 2021-02-23 RX ORDER — SULFAMETHOXAZOLE AND TRIMETHOPRIM 800; 160 MG/1; MG/1
1 TABLET ORAL 2 TIMES DAILY
Qty: 20 TABLET | Refills: 0 | Status: SHIPPED | OUTPATIENT
Start: 2021-02-23 | End: 2021-03-05

## 2021-02-23 NOTE — PROGRESS NOTES
Dharmesh Wick is a 60year old male. HPI:   Patient presents with:  elevated psa: PT presents for follow up visit for elevated psa. PT states symptoms are stable.        53-year-old male with a chronic history of elevated PSA [facility] 24 Hr TAKE 1 TABLET(10 MG) BY MOUTH DAILY (Patient not taking: Reported on 2/23/2021) 90 tablet 1   • TADALAFIL 10 MG Oral Tab TAKE ONE TABLET BY MOUTH DAILY AS NEEDED FRO ERECTILE DYSFUNCTION (Patient not taking: Reported on 2/23/2021) 6 table

## 2021-04-01 ENCOUNTER — LAB ENCOUNTER (OUTPATIENT)
Dept: LAB | Age: 56
End: 2021-04-01
Attending: UROLOGY
Payer: COMMERCIAL

## 2021-04-01 DIAGNOSIS — R97.20 ELEVATED PSA: ICD-10-CM

## 2021-04-01 PROCEDURE — 84153 ASSAY OF PSA TOTAL: CPT

## 2021-04-01 PROCEDURE — 36415 COLL VENOUS BLD VENIPUNCTURE: CPT

## 2021-04-06 ENCOUNTER — OFFICE VISIT (OUTPATIENT)
Dept: SURGERY | Facility: CLINIC | Age: 56
End: 2021-04-06
Payer: COMMERCIAL

## 2021-04-06 VITALS — SYSTOLIC BLOOD PRESSURE: 111 MMHG | DIASTOLIC BLOOD PRESSURE: 71 MMHG | HEART RATE: 52 BPM

## 2021-04-06 DIAGNOSIS — R97.20 ELEVATED PSA: Primary | ICD-10-CM

## 2021-04-06 PROCEDURE — 3078F DIAST BP <80 MM HG: CPT | Performed by: UROLOGY

## 2021-04-06 PROCEDURE — 99214 OFFICE O/P EST MOD 30 MIN: CPT | Performed by: UROLOGY

## 2021-04-06 PROCEDURE — 3074F SYST BP LT 130 MM HG: CPT | Performed by: UROLOGY

## 2021-04-06 NOTE — PROGRESS NOTES
Jessa Blackmon is a 60year old male. HPI:   Patient presents with:  elevated psa: PT prensts       13-year-old -American male in follow-up to visit February 23, 2021 with a chronic history of elevated serum PSA.   No family history of prostate canc masses     ASSESSMENT/PLAN:   Assessment   Elevated psa  (primary encounter diagnosis)    Recommended:  –Discussed options for management including proceeding with a transrectal ultrasound and biopsy, consideration for uronav fusion biopsy, continued close

## 2021-06-05 RX ORDER — LISINOPRIL 30 MG/1
TABLET ORAL
Qty: 90 TABLET | Refills: 0 | Status: SHIPPED | OUTPATIENT
Start: 2021-06-05 | End: 2021-07-20

## 2021-07-14 ENCOUNTER — LAB ENCOUNTER (OUTPATIENT)
Dept: LAB | Age: 56
End: 2021-07-14
Attending: UROLOGY
Payer: COMMERCIAL

## 2021-07-14 DIAGNOSIS — R97.20 ELEVATED PSA: ICD-10-CM

## 2021-07-14 LAB — PSA SERPL-MCNC: 8.83 NG/ML (ref ?–4)

## 2021-07-14 PROCEDURE — 36415 COLL VENOUS BLD VENIPUNCTURE: CPT

## 2021-07-14 PROCEDURE — 84153 ASSAY OF PSA TOTAL: CPT

## 2021-07-20 ENCOUNTER — OFFICE VISIT (OUTPATIENT)
Dept: FAMILY MEDICINE CLINIC | Facility: CLINIC | Age: 56
End: 2021-07-20
Payer: COMMERCIAL

## 2021-07-20 VITALS
WEIGHT: 219 LBS | HEART RATE: 55 BPM | HEIGHT: 73 IN | DIASTOLIC BLOOD PRESSURE: 74 MMHG | SYSTOLIC BLOOD PRESSURE: 117 MMHG | BODY MASS INDEX: 29.03 KG/M2

## 2021-07-20 DIAGNOSIS — Z00.00 ROUTINE GENERAL MEDICAL EXAMINATION AT A HEALTH CARE FACILITY: Primary | ICD-10-CM

## 2021-07-20 DIAGNOSIS — I10 ESSENTIAL HYPERTENSION WITH GOAL BLOOD PRESSURE LESS THAN 130/85: ICD-10-CM

## 2021-07-20 DIAGNOSIS — R97.20 ELEVATED PSA: ICD-10-CM

## 2021-07-20 PROCEDURE — 3008F BODY MASS INDEX DOCD: CPT | Performed by: FAMILY MEDICINE

## 2021-07-20 PROCEDURE — 3078F DIAST BP <80 MM HG: CPT | Performed by: FAMILY MEDICINE

## 2021-07-20 PROCEDURE — 3074F SYST BP LT 130 MM HG: CPT | Performed by: FAMILY MEDICINE

## 2021-07-20 PROCEDURE — 99396 PREV VISIT EST AGE 40-64: CPT | Performed by: FAMILY MEDICINE

## 2021-07-20 RX ORDER — LISINOPRIL 20 MG/1
20 TABLET ORAL DAILY
Qty: 90 TABLET | Refills: 1 | Status: SHIPPED | OUTPATIENT
Start: 2021-07-20

## 2021-07-20 NOTE — PROGRESS NOTES
HPI/Subjective:   Patient ID: Abbi Nguyen is a 60year old male.     HPI  Patient presents with:  Routine Physical: annual physical     Past Medical History:   Diagnosis Date   • High blood pressure    • Unspecified essential hypertension      History/Othe normal.   Cardiovascular:      Rate and Rhythm: Regular rhythm. Pulses:           Carotid pulses are 2+ on the right side and 2+ on the left side. Radial pulses are 2+ on the right side and 2+ on the left side.       Heart sounds: Normal heart so

## 2021-07-23 ENCOUNTER — LAB ENCOUNTER (OUTPATIENT)
Dept: LAB | Age: 56
End: 2021-07-23
Attending: FAMILY MEDICINE
Payer: COMMERCIAL

## 2021-07-23 DIAGNOSIS — Z00.00 ROUTINE GENERAL MEDICAL EXAMINATION AT A HEALTH CARE FACILITY: ICD-10-CM

## 2021-07-23 LAB
ALBUMIN SERPL-MCNC: 3.4 G/DL (ref 3.4–5)
ALBUMIN/GLOB SERPL: 0.8 {RATIO} (ref 1–2)
ALP LIVER SERPL-CCNC: 58 U/L
ALT SERPL-CCNC: 18 U/L
ANION GAP SERPL CALC-SCNC: 6 MMOL/L (ref 0–18)
AST SERPL-CCNC: 15 U/L (ref 15–37)
BASOPHILS # BLD AUTO: 0.03 X10(3) UL (ref 0–0.2)
BASOPHILS NFR BLD AUTO: 1 %
BILIRUB SERPL-MCNC: 0.9 MG/DL (ref 0.1–2)
BUN BLD-MCNC: 35 MG/DL (ref 7–18)
BUN/CREAT SERPL: 29.7 (ref 10–20)
CALCIUM BLD-MCNC: 9.1 MG/DL (ref 8.5–10.1)
CHLORIDE SERPL-SCNC: 108 MMOL/L (ref 98–112)
CHOLEST SMN-MCNC: 183 MG/DL (ref ?–200)
CO2 SERPL-SCNC: 26 MMOL/L (ref 21–32)
CREAT BLD-MCNC: 1.18 MG/DL
DEPRECATED RDW RBC AUTO: 41.5 FL (ref 35.1–46.3)
EOSINOPHIL # BLD AUTO: 0.01 X10(3) UL (ref 0–0.7)
EOSINOPHIL NFR BLD AUTO: 0.3 %
ERYTHROCYTE [DISTWIDTH] IN BLOOD BY AUTOMATED COUNT: 12.6 % (ref 11–15)
GLOBULIN PLAS-MCNC: 4.1 G/DL (ref 2.8–4.4)
GLUCOSE BLD-MCNC: 82 MG/DL (ref 70–99)
HCT VFR BLD AUTO: 43.1 %
HDLC SERPL-MCNC: 63 MG/DL (ref 40–59)
HGB BLD-MCNC: 13.4 G/DL
IMM GRANULOCYTES # BLD AUTO: 0 X10(3) UL (ref 0–1)
IMM GRANULOCYTES NFR BLD: 0 %
LDLC SERPL CALC-MCNC: 110 MG/DL (ref ?–100)
LYMPHOCYTES # BLD AUTO: 1.33 X10(3) UL (ref 1–4)
LYMPHOCYTES NFR BLD AUTO: 44.6 %
M PROTEIN MFR SERPL ELPH: 7.5 G/DL (ref 6.4–8.2)
MCH RBC QN AUTO: 27.8 PG (ref 26–34)
MCHC RBC AUTO-ENTMCNC: 31.1 G/DL (ref 31–37)
MCV RBC AUTO: 89.4 FL
MONOCYTES # BLD AUTO: 0.18 X10(3) UL (ref 0.1–1)
MONOCYTES NFR BLD AUTO: 6 %
NEUTROPHILS # BLD AUTO: 1.43 X10 (3) UL (ref 1.5–7.7)
NEUTROPHILS # BLD AUTO: 1.43 X10(3) UL (ref 1.5–7.7)
NEUTROPHILS NFR BLD AUTO: 48.1 %
NONHDLC SERPL-MCNC: 120 MG/DL (ref ?–130)
OSMOLALITY SERPL CALC.SUM OF ELEC: 297 MOSM/KG (ref 275–295)
PATIENT FASTING Y/N/NP: YES
PATIENT FASTING Y/N/NP: YES
PLATELET # BLD AUTO: 204 10(3)UL (ref 150–450)
POTASSIUM SERPL-SCNC: 4.7 MMOL/L (ref 3.5–5.1)
RBC # BLD AUTO: 4.82 X10(6)UL
SODIUM SERPL-SCNC: 140 MMOL/L (ref 136–145)
TRIGL SERPL-MCNC: 51 MG/DL (ref 30–149)
VLDLC SERPL CALC-MCNC: 9 MG/DL (ref 0–30)
WBC # BLD AUTO: 3 X10(3) UL (ref 4–11)

## 2021-07-23 PROCEDURE — 80053 COMPREHEN METABOLIC PANEL: CPT

## 2021-07-23 PROCEDURE — 85025 COMPLETE CBC W/AUTO DIFF WBC: CPT

## 2021-07-23 PROCEDURE — 36415 COLL VENOUS BLD VENIPUNCTURE: CPT

## 2021-07-23 PROCEDURE — 80061 LIPID PANEL: CPT

## 2021-09-07 ENCOUNTER — OFFICE VISIT (OUTPATIENT)
Dept: SURGERY | Facility: CLINIC | Age: 56
End: 2021-09-07
Payer: COMMERCIAL

## 2021-09-07 VITALS
RESPIRATION RATE: 16 BRPM | HEART RATE: 60 BPM | DIASTOLIC BLOOD PRESSURE: 65 MMHG | HEIGHT: 73 IN | WEIGHT: 221 LBS | SYSTOLIC BLOOD PRESSURE: 110 MMHG | BODY MASS INDEX: 29.29 KG/M2

## 2021-09-07 DIAGNOSIS — R97.20 ELEVATED PSA: Primary | ICD-10-CM

## 2021-09-07 PROCEDURE — 3074F SYST BP LT 130 MM HG: CPT | Performed by: UROLOGY

## 2021-09-07 PROCEDURE — 3078F DIAST BP <80 MM HG: CPT | Performed by: UROLOGY

## 2021-09-07 PROCEDURE — 3008F BODY MASS INDEX DOCD: CPT | Performed by: UROLOGY

## 2021-09-07 PROCEDURE — 99213 OFFICE O/P EST LOW 20 MIN: CPT | Performed by: UROLOGY

## 2021-09-07 NOTE — PROGRESS NOTES
Terrell Valdez is a 60year old male. HPI:   Patient presents with:  elevated psa      49-year-old male with a history of elevated serum PSA in follow-up to a previous visit April 6, 2021.   The patient had a prostate biopsy in January 2020 which was negat predominantly T2 hyperintense. There are a few areas of wedge-shaped hypointense signal intensity.  No focal well-circumscribed lesions are detected.       TRANSITION ZONE: Numerous well-circumscribed ovoid nodules are seen throughout the transition zone, c complication in the imaged volume.       6.  No suspicious osseous lesions are detected.       7. Lesser incidental findings as above.             Prostate Imaging - Reporting and Data System version 2 (PI-RADS v2) Assessment Categories:       PI-RADS 1: Ve Reported on 9/7/2021) 90 tablet 1   • TADALAFIL 10 MG Oral Tab TAKE ONE TABLET BY MOUTH DAILY AS NEEDED FRO ERECTILE DYSFUNCTION (Patient not taking: Reported on 9/7/2021) 6 tablet 5       Allergies:  No Known Allergies      ROS:       PHYSICAL EXAM:

## 2022-03-07 RX ORDER — LISINOPRIL 20 MG/1
20 TABLET ORAL DAILY
Qty: 30 TABLET | Refills: 0 | Status: SHIPPED | OUTPATIENT
Start: 2022-03-07 | End: 2022-03-26

## 2022-03-17 ENCOUNTER — LAB ENCOUNTER (OUTPATIENT)
Dept: LAB | Age: 57
End: 2022-03-17
Attending: UROLOGY
Payer: COMMERCIAL

## 2022-03-17 DIAGNOSIS — R97.20 ELEVATED PSA: ICD-10-CM

## 2022-03-17 LAB — PSA SERPL-MCNC: 22.1 NG/ML (ref ?–4)

## 2022-03-17 PROCEDURE — 84153 ASSAY OF PSA TOTAL: CPT

## 2022-03-17 PROCEDURE — 36415 COLL VENOUS BLD VENIPUNCTURE: CPT

## 2022-03-22 ENCOUNTER — TELEPHONE (OUTPATIENT)
Dept: SURGERY | Facility: CLINIC | Age: 57
End: 2022-03-22

## 2022-03-22 NOTE — TELEPHONE ENCOUNTER
S/W pt and informed him of FRANKLYN's results msg and instructions as stated below and pt agreed to proceed in schdg the UroNav procedure and I told him that our surgery yury Cullen will be calling him. Dr. Moises Delgado, can you please send the ABX to pt's pharmacy. Tasked to [facility] [place].

## 2022-03-22 NOTE — TELEPHONE ENCOUNTER
----- Message from Kori Delarosa MD sent at 3/21/2022 10:37 AM CDT -----  Urology staff,Please call this patient. Let him know that his PSA continues to be elevated at 22.10 March 17, 2022 but I would recommend repeating his prostate biopsy to rule out cancer but this time I would like to perform an MR ultrasound fusion biopsy rather than a standard office biopsy. In this method we would use the information from his prostate MRI September 2020 to target the areas that were potentially suspicious on that MRI. Did not show any overtly suspicious findings but we would still target them to make sure that they do not have any cancer. The patient is agreeable I sent this message also to Lupe Dowd to work on getting him scheduled.      Can you please call and schedule this patient for an MRI ultrasound fusion biopsy

## 2022-03-22 NOTE — TELEPHONE ENCOUNTER
S/w pt. Read message from [facility] [place] as stated below. Pt verbalized understanding and is agreeable to MRI ultrasound fusion biopsy. - Explained to pt that Joaquina Silver, our surgery scheduler will be reaching out to him to schedule.

## 2022-03-22 NOTE — TELEPHONE ENCOUNTER
----- Message from Chapin Aden MD sent at 3/21/2022 10:37 AM CDT -----  Urology staff,Please call this patient. Let him know that his PSA continues to be elevated at 22.10 March 17, 2022 but I would recommend repeating his prostate biopsy to rule out cancer but this time I would like to perform an MR ultrasound fusion biopsy rather than a standard office biopsy. In this method we would use the information from his prostate MRI September 2020 to target the areas that were potentially suspicious on that MRI. Did not show any overtly suspicious findings but we would still target them to make sure that they do not have any cancer. The patient is agreeable I sent this message also to Cherie Suh to work on getting him scheduled.      Can you please call and schedule this patient for an MRI ultrasound fusion biopsy

## 2022-03-23 RX ORDER — CEFDINIR 300 MG/1
300 CAPSULE ORAL EVERY 12 HOURS
Qty: 6 CAPSULE | Refills: 0 | Status: SHIPPED | OUTPATIENT
Start: 2022-03-23 | End: 2022-03-26

## 2022-03-23 RX ORDER — CIPROFLOXACIN 500 MG/1
500 TABLET, FILM COATED ORAL EVERY 12 HOURS
Qty: 6 TABLET | Refills: 0 | Status: SHIPPED | OUTPATIENT
Start: 2022-03-23 | End: 2022-03-26

## 2022-03-23 NOTE — TELEPHONE ENCOUNTER
L/m informing patient previous Velma Rodriguezer is too old, will have to repeat, there' an order that has been approved.  Please transfer [id number]

## 2022-03-23 NOTE — TELEPHONE ENCOUNTER
Spoke with patient, calling today to schedule new mri.      tentatively will be schedule  Mri prostate fusion biopsy, Friday 04/15/2022    Went over pre-op instructions, all will be sent to patient' my chart

## 2022-03-26 ENCOUNTER — OFFICE VISIT (OUTPATIENT)
Dept: FAMILY MEDICINE CLINIC | Facility: CLINIC | Age: 57
End: 2022-03-26
Payer: COMMERCIAL

## 2022-03-26 VITALS
HEART RATE: 61 BPM | WEIGHT: 183 LBS | SYSTOLIC BLOOD PRESSURE: 108 MMHG | HEIGHT: 73 IN | BODY MASS INDEX: 24.25 KG/M2 | DIASTOLIC BLOOD PRESSURE: 66 MMHG

## 2022-03-26 DIAGNOSIS — R97.20 ELEVATED PSA: ICD-10-CM

## 2022-03-26 DIAGNOSIS — I10 ESSENTIAL HYPERTENSION WITH GOAL BLOOD PRESSURE LESS THAN 130/85: Primary | ICD-10-CM

## 2022-03-26 PROCEDURE — 3074F SYST BP LT 130 MM HG: CPT | Performed by: FAMILY MEDICINE

## 2022-03-26 PROCEDURE — 3078F DIAST BP <80 MM HG: CPT | Performed by: FAMILY MEDICINE

## 2022-03-26 PROCEDURE — 99214 OFFICE O/P EST MOD 30 MIN: CPT | Performed by: FAMILY MEDICINE

## 2022-03-26 PROCEDURE — 3008F BODY MASS INDEX DOCD: CPT | Performed by: FAMILY MEDICINE

## 2022-03-26 RX ORDER — LISINOPRIL 10 MG/1
10 TABLET ORAL DAILY
Qty: 90 TABLET | Refills: 1 | Status: SHIPPED | OUTPATIENT
Start: 2022-03-26

## 2022-03-28 ENCOUNTER — HOSPITAL ENCOUNTER (OUTPATIENT)
Dept: MRI IMAGING | Facility: HOSPITAL | Age: 57
Discharge: HOME | End: 2022-03-28
Attending: UROLOGY
Payer: COMMERCIAL

## 2022-03-28 DIAGNOSIS — R97.20 ELEVATED PSA: ICD-10-CM

## 2022-03-28 LAB — CREAT BLD-MCNC: 1.2 MG/DL

## 2022-03-28 PROCEDURE — 82565 ASSAY OF CREATININE: CPT

## 2022-03-28 PROCEDURE — 72197 MRI PELVIS W/O & W/DYE: CPT | Performed by: UROLOGY

## 2022-03-28 PROCEDURE — A9575 INJ GADOTERATE MEGLUMI 0.1ML: HCPCS | Performed by: UROLOGY

## 2022-03-29 ENCOUNTER — TELEPHONE (OUTPATIENT)
Dept: SURGERY | Facility: CLINIC | Age: 57
End: 2022-03-29

## 2022-03-29 NOTE — TELEPHONE ENCOUNTER
Dali Brown, please take a look at patient' Mri results, advise if patient should proceed with uronav fusion prostate biopsy, scheduled  Friday 04/15/2022

## 2022-03-31 RX ORDER — CIPROFLOXACIN 500 MG/1
500 TABLET, FILM COATED ORAL 2 TIMES DAILY
Qty: 20 TABLET | Refills: 0 | Status: SHIPPED | OUTPATIENT
Start: 2022-03-31

## 2022-03-31 NOTE — TELEPHONE ENCOUNTER
Spoke with patient informed that mri fusion bx., will not be needed, it' cancelled for Friday 04/15/2022, patient verbalized understanding.

## 2022-03-31 NOTE — TELEPHONE ENCOUNTER
Urology staff,  Please call this patient back. Let him know that I reviewed his MRI. It shows no suspicious findings which is good news. His PSA had increased in early March and I can explain the reason for that. I would like to treat him with a 10-day course of ciprofloxacin assuming he might have an element of inflammation or infection in the prostate that could be driving up his PSA. A prescription for the antibiotic has been sent to his pharmacy. I would like him to follow-up with me in 4 to 6 weeks with a repeat PSA 1 to 2 days prior to the appointment to see if the PSA was appropriately decreased.

## 2022-04-21 ENCOUNTER — LAB ENCOUNTER (OUTPATIENT)
Dept: LAB | Age: 57
End: 2022-04-21
Attending: UROLOGY
Payer: COMMERCIAL

## 2022-04-21 DIAGNOSIS — R97.20 ELEVATED PSA: ICD-10-CM

## 2022-04-21 LAB — PSA SERPL-MCNC: 11.5 NG/ML (ref ?–4)

## 2022-04-21 PROCEDURE — 36415 COLL VENOUS BLD VENIPUNCTURE: CPT

## 2022-04-21 PROCEDURE — 84153 ASSAY OF PSA TOTAL: CPT

## 2022-05-09 ENCOUNTER — OFFICE VISIT (OUTPATIENT)
Dept: SURGERY | Facility: CLINIC | Age: 57
End: 2022-05-09
Payer: COMMERCIAL

## 2022-05-09 DIAGNOSIS — R97.20 ELEVATED PSA: Primary | ICD-10-CM

## 2022-05-09 PROCEDURE — 99213 OFFICE O/P EST LOW 20 MIN: CPT | Performed by: UROLOGY

## 2022-08-03 ENCOUNTER — TELEPHONE (OUTPATIENT)
Dept: FAMILY MEDICINE CLINIC | Facility: CLINIC | Age: 57
End: 2022-08-03

## 2022-08-03 DIAGNOSIS — Z00.00 ROUTINE GENERAL MEDICAL EXAMINATION AT A HEALTH CARE FACILITY: Primary | ICD-10-CM

## 2022-08-03 NOTE — TELEPHONE ENCOUNTER
Pt came in to do his bloodwork before seeing Dr. Jessi Luo on Monday, But no order in system. Please contact pt when order is available.

## 2022-08-18 ENCOUNTER — LAB ENCOUNTER (OUTPATIENT)
Dept: LAB | Age: 57
End: 2022-08-18
Attending: FAMILY MEDICINE
Payer: COMMERCIAL

## 2022-08-18 DIAGNOSIS — R97.20 ELEVATED PSA: ICD-10-CM

## 2022-08-18 DIAGNOSIS — Z00.00 ROUTINE GENERAL MEDICAL EXAMINATION AT A HEALTH CARE FACILITY: ICD-10-CM

## 2022-08-18 LAB
ALBUMIN SERPL-MCNC: 3.7 G/DL (ref 3.4–5)
ALBUMIN/GLOB SERPL: 0.9 {RATIO} (ref 1–2)
ALP LIVER SERPL-CCNC: 55 U/L
ALT SERPL-CCNC: 18 U/L
ANION GAP SERPL CALC-SCNC: 6 MMOL/L (ref 0–18)
AST SERPL-CCNC: 33 U/L (ref 15–37)
BASOPHILS # BLD AUTO: 0.02 X10(3) UL (ref 0–0.2)
BASOPHILS NFR BLD AUTO: 0.6 %
BILIRUB SERPL-MCNC: 1.2 MG/DL (ref 0.1–2)
BUN BLD-MCNC: 29 MG/DL (ref 7–18)
BUN/CREAT SERPL: 20.6 (ref 10–20)
CALCIUM BLD-MCNC: 9.4 MG/DL (ref 8.5–10.1)
CHLORIDE SERPL-SCNC: 109 MMOL/L (ref 98–112)
CHOLEST SERPL-MCNC: 206 MG/DL (ref ?–200)
CO2 SERPL-SCNC: 25 MMOL/L (ref 21–32)
CREAT BLD-MCNC: 1.41 MG/DL
DEPRECATED RDW RBC AUTO: 42.9 FL (ref 35.1–46.3)
EOSINOPHIL # BLD AUTO: 0.02 X10(3) UL (ref 0–0.7)
EOSINOPHIL NFR BLD AUTO: 0.6 %
ERYTHROCYTE [DISTWIDTH] IN BLOOD BY AUTOMATED COUNT: 13 % (ref 11–15)
FASTING PATIENT LIPID ANSWER: YES
FASTING STATUS PATIENT QL REPORTED: YES
GFR SERPLBLD BASED ON 1.73 SQ M-ARVRAT: 57 ML/MIN/1.73M2 (ref 60–?)
GLOBULIN PLAS-MCNC: 4.3 G/DL (ref 2.8–4.4)
GLUCOSE BLD-MCNC: 83 MG/DL (ref 70–99)
HCT VFR BLD AUTO: 46.8 %
HDLC SERPL-MCNC: 98 MG/DL (ref 40–59)
HGB BLD-MCNC: 14.5 G/DL
IMM GRANULOCYTES # BLD AUTO: 0.01 X10(3) UL (ref 0–1)
IMM GRANULOCYTES NFR BLD: 0.3 %
LDLC SERPL CALC-MCNC: 99 MG/DL (ref ?–100)
LYMPHOCYTES # BLD AUTO: 1.46 X10(3) UL (ref 1–4)
LYMPHOCYTES NFR BLD AUTO: 44 %
MCH RBC QN AUTO: 28 PG (ref 26–34)
MCHC RBC AUTO-ENTMCNC: 31 G/DL (ref 31–37)
MCV RBC AUTO: 90.5 FL
MONOCYTES # BLD AUTO: 0.15 X10(3) UL (ref 0.1–1)
MONOCYTES NFR BLD AUTO: 4.5 %
NEUTROPHILS # BLD AUTO: 1.66 X10 (3) UL (ref 1.5–7.7)
NEUTROPHILS # BLD AUTO: 1.66 X10(3) UL (ref 1.5–7.7)
NEUTROPHILS NFR BLD AUTO: 50 %
NONHDLC SERPL-MCNC: 108 MG/DL (ref ?–130)
OSMOLALITY SERPL CALC.SUM OF ELEC: 295 MOSM/KG (ref 275–295)
PLATELET # BLD AUTO: 269 10(3)UL (ref 150–450)
POTASSIUM SERPL-SCNC: 4.1 MMOL/L (ref 3.5–5.1)
PROT SERPL-MCNC: 8 G/DL (ref 6.4–8.2)
PSA SERPL-MCNC: 13.9 NG/ML (ref ?–4)
RBC # BLD AUTO: 5.17 X10(6)UL
SODIUM SERPL-SCNC: 140 MMOL/L (ref 136–145)
TRIGL SERPL-MCNC: 48 MG/DL (ref 30–149)
VLDLC SERPL CALC-MCNC: 8 MG/DL (ref 0–30)
WBC # BLD AUTO: 3.3 X10(3) UL (ref 4–11)

## 2022-08-18 PROCEDURE — 84153 ASSAY OF PSA TOTAL: CPT

## 2022-08-18 PROCEDURE — 36415 COLL VENOUS BLD VENIPUNCTURE: CPT

## 2022-08-18 PROCEDURE — 85025 COMPLETE CBC W/AUTO DIFF WBC: CPT

## 2022-08-18 PROCEDURE — 80053 COMPREHEN METABOLIC PANEL: CPT

## 2022-08-18 PROCEDURE — 80061 LIPID PANEL: CPT

## 2022-08-29 ENCOUNTER — OFFICE VISIT (OUTPATIENT)
Dept: FAMILY MEDICINE CLINIC | Facility: CLINIC | Age: 57
End: 2022-08-29
Payer: COMMERCIAL

## 2022-08-29 VITALS
HEART RATE: 63 BPM | HEIGHT: 73 IN | DIASTOLIC BLOOD PRESSURE: 79 MMHG | BODY MASS INDEX: 28.49 KG/M2 | SYSTOLIC BLOOD PRESSURE: 137 MMHG | WEIGHT: 215 LBS

## 2022-08-29 DIAGNOSIS — Z00.00 ROUTINE GENERAL MEDICAL EXAMINATION AT A HEALTH CARE FACILITY: ICD-10-CM

## 2022-08-29 DIAGNOSIS — I10 ESSENTIAL HYPERTENSION WITH GOAL BLOOD PRESSURE LESS THAN 130/85: ICD-10-CM

## 2022-08-29 DIAGNOSIS — R97.20 ELEVATED PSA: ICD-10-CM

## 2022-08-29 DIAGNOSIS — N28.9 RENAL INSUFFICIENCY: Primary | ICD-10-CM

## 2022-08-29 NOTE — PROGRESS NOTES
Subjective:   Patient ID: Shirley Roldan is a 59year old male. HPI  Patient presents with:  Routine Physical: annual physical     History/Other:    Patient Active Problem List:     Elevated blood pressure  elevated PSA  Review of Systems   Constitutional: Positive for fatigue. HENT: Negative. Eyes: Negative. Respiratory: Negative. Cardiovascular: Negative. Gastrointestinal: Negative. Endocrine: Negative for polydipsia, polyphagia and polyuria. Genitourinary: Negative. Musculoskeletal: Negative. Skin: Negative. No mole changes   Allergic/Immunologic: Negative for environmental allergies. Neurological: Negative for dizziness, weakness, numbness and headaches. Hematological: Negative. Psychiatric/Behavioral: Negative for sleep disturbance. No anxiety or depressed feelings     No current outpatient medications on file. Allergies:No Known Allergies    Objective:   Physical Exam  Vitals reviewed. Constitutional:       Appearance: Normal appearance. He is well-developed. HENT:      Head: Normocephalic. Right Ear: Tympanic membrane, ear canal and external ear normal.      Left Ear: Tympanic membrane, ear canal and external ear normal.      Nose: Nose normal.   Eyes:      General: Lids are normal.      Conjunctiva/sclera: Conjunctivae normal.      Pupils: Pupils are equal, round, and reactive to light. Funduscopic exam:     Right eye: No hemorrhage or papilledema. Left eye: No hemorrhage or papilledema. Neck:      Vascular: Normal carotid pulses. No JVD. Trachea: Trachea normal.   Cardiovascular:      Rate and Rhythm: Regular rhythm. Pulses:           Carotid pulses are 2+ on the right side and 2+ on the left side. Radial pulses are 2+ on the right side and 2+ on the left side. Heart sounds: Normal heart sounds. Pulmonary:      Breath sounds: Normal breath sounds. Abdominal:      Tenderness: There is no abdominal tenderness. Musculoskeletal:         General: Normal range of motion. Cervical back: Normal range of motion and neck supple. Lymphadenopathy:      Cervical: No cervical adenopathy. Skin:     Comments: No suspicious lesions waist up exam   Neurological:      General: No focal deficit present. Mental Status: He is alert and oriented to person, place, and time. Sensory: No sensory deficit. Deep Tendon Reflexes: Reflexes are normal and symmetric. Psychiatric:         Mood and Affect: Mood normal. Mood is not anxious or depressed. Assessment & Plan:   Renal insufficiency  (primary encounter diagnosis)  Routine general medical examination at a health care facility  Essential hypertension with goal blood pressure less than 130/85  Elevated PSA    Has been off lisinopril since having significatn weight loss and BP well controlled. Remain off BP med. Retest renal function six motnhs and encourage good water intake. Sees urologist for elevated PSA. Discussed diet , exercise and weight goal to under 200.     Orders Placed This Encounter      Basic Metabolic Panel (8)      Meds This Visit:  Requested Prescriptions      No prescriptions requested or ordered in this encounter       Imaging & Referrals:  None

## 2022-11-09 ENCOUNTER — OFFICE VISIT (OUTPATIENT)
Dept: SURGERY | Facility: CLINIC | Age: 57
End: 2022-11-09
Payer: COMMERCIAL

## 2022-11-09 DIAGNOSIS — R97.20 ELEVATED PSA: Primary | ICD-10-CM

## 2022-11-09 PROCEDURE — 99214 OFFICE O/P EST MOD 30 MIN: CPT | Performed by: NURSE PRACTITIONER

## 2023-02-06 ENCOUNTER — APPOINTMENT (OUTPATIENT)
Dept: INTERVENTIONAL RADIOLOGY/VASCULAR | Facility: HOSPITAL | Age: 58
End: 2023-02-06
Attending: NURSE PRACTITIONER
Payer: COMMERCIAL

## 2023-02-06 ENCOUNTER — APPOINTMENT (OUTPATIENT)
Dept: CV DIAGNOSTICS | Facility: HOSPITAL | Age: 58
End: 2023-02-06
Attending: INTERNAL MEDICINE
Payer: COMMERCIAL

## 2023-02-06 ENCOUNTER — HOSPITAL ENCOUNTER (INPATIENT)
Facility: HOSPITAL | Age: 58
LOS: 1 days | Discharge: HOME | End: 2023-02-07
Attending: EMERGENCY MEDICINE | Admitting: HOSPITALIST
Payer: COMMERCIAL

## 2023-02-06 ENCOUNTER — APPOINTMENT (OUTPATIENT)
Dept: GENERAL RADIOLOGY | Facility: HOSPITAL | Age: 58
End: 2023-02-06
Attending: EMERGENCY MEDICINE
Payer: COMMERCIAL

## 2023-02-06 DIAGNOSIS — I20.8 STABLE ANGINA PECTORIS (HCC): Primary | ICD-10-CM

## 2023-02-06 PROBLEM — I20.89 STABLE ANGINA PECTORIS (HCC): Status: ACTIVE | Noted: 2023-02-06

## 2023-02-06 PROBLEM — I20.89 STABLE ANGINA PECTORIS: Status: ACTIVE | Noted: 2023-02-06

## 2023-02-06 LAB
ANION GAP SERPL CALC-SCNC: 1 MMOL/L (ref 0–18)
ATRIAL RATE: 54 BPM
ATRIAL RATE: 54 BPM
BASOPHILS # BLD AUTO: 0.02 X10(3) UL (ref 0–0.2)
BASOPHILS NFR BLD AUTO: 0.7 %
BUN BLD-MCNC: 25 MG/DL (ref 7–18)
BUN/CREAT SERPL: 16.8 (ref 10–20)
CALCIUM BLD-MCNC: 8.9 MG/DL (ref 8.5–10.1)
CHLORIDE SERPL-SCNC: 109 MMOL/L (ref 98–112)
CO2 SERPL-SCNC: 31 MMOL/L (ref 21–32)
CREAT BLD-MCNC: 1.49 MG/DL
DEPRECATED RDW RBC AUTO: 42.1 FL (ref 35.1–46.3)
EOSINOPHIL # BLD AUTO: 0.05 X10(3) UL (ref 0–0.7)
EOSINOPHIL NFR BLD AUTO: 1.7 %
ERYTHROCYTE [DISTWIDTH] IN BLOOD BY AUTOMATED COUNT: 12.7 % (ref 11–15)
GFR SERPLBLD BASED ON 1.73 SQ M-ARVRAT: 53 ML/MIN/1.73M2 (ref 60–?)
GLUCOSE BLD-MCNC: 102 MG/DL (ref 70–99)
HCT VFR BLD AUTO: 43.4 %
HGB BLD-MCNC: 13.5 G/DL
IMM GRANULOCYTES # BLD AUTO: 0.01 X10(3) UL (ref 0–1)
IMM GRANULOCYTES NFR BLD: 0.3 %
LYMPHOCYTES # BLD AUTO: 1.32 X10(3) UL (ref 1–4)
LYMPHOCYTES NFR BLD AUTO: 44 %
MCH RBC QN AUTO: 28 PG (ref 26–34)
MCHC RBC AUTO-ENTMCNC: 31.1 G/DL (ref 31–37)
MCV RBC AUTO: 89.9 FL
MONOCYTES # BLD AUTO: 0.12 X10(3) UL (ref 0.1–1)
MONOCYTES NFR BLD AUTO: 4 %
NEUTROPHILS # BLD AUTO: 1.48 X10 (3) UL (ref 1.5–7.7)
NEUTROPHILS # BLD AUTO: 1.48 X10(3) UL (ref 1.5–7.7)
NEUTROPHILS NFR BLD AUTO: 49.3 %
OSMOLALITY SERPL CALC.SUM OF ELEC: 297 MOSM/KG (ref 275–295)
P AXIS: 63 DEGREES
P AXIS: 66 DEGREES
P-R INTERVAL: 170 MS
P-R INTERVAL: 176 MS
PLATELET # BLD AUTO: 220 10(3)UL (ref 150–450)
POTASSIUM SERPL-SCNC: 4.5 MMOL/L (ref 3.5–5.1)
Q-T INTERVAL: 416 MS
Q-T INTERVAL: 440 MS
QRS DURATION: 78 MS
QRS DURATION: 92 MS
QTC CALCULATION (BEZET): 394 MS
QTC CALCULATION (BEZET): 417 MS
R AXIS: 51 DEGREES
R AXIS: 52 DEGREES
RBC # BLD AUTO: 4.83 X10(6)UL
SARS-COV-2 RNA RESP QL NAA+PROBE: NOT DETECTED
SODIUM SERPL-SCNC: 141 MMOL/L (ref 136–145)
T AXIS: 38 DEGREES
T AXIS: 39 DEGREES
TROPONIN I HIGH SENSITIVITY: 14 NG/L
VENTRICULAR RATE: 54 BPM
VENTRICULAR RATE: 54 BPM
WBC # BLD AUTO: 3 X10(3) UL (ref 4–11)

## 2023-02-06 PROCEDURE — 99222 1ST HOSP IP/OBS MODERATE 55: CPT | Performed by: HOSPITALIST

## 2023-02-06 PROCEDURE — 4A023N7 MEASUREMENT OF CARDIAC SAMPLING AND PRESSURE, LEFT HEART, PERCUTANEOUS APPROACH: ICD-10-PCS | Performed by: INTERNAL MEDICINE

## 2023-02-06 PROCEDURE — 71045 X-RAY EXAM CHEST 1 VIEW: CPT | Performed by: EMERGENCY MEDICINE

## 2023-02-06 PROCEDURE — B2111ZZ FLUOROSCOPY OF MULTIPLE CORONARY ARTERIES USING LOW OSMOLAR CONTRAST: ICD-10-PCS | Performed by: INTERNAL MEDICINE

## 2023-02-06 PROCEDURE — 93306 TTE W/DOPPLER COMPLETE: CPT | Performed by: INTERNAL MEDICINE

## 2023-02-06 RX ORDER — AMLODIPINE BESYLATE 5 MG/1
5 TABLET ORAL DAILY
Status: DISCONTINUED | OUTPATIENT
Start: 2023-02-06 | End: 2023-02-07

## 2023-02-06 RX ORDER — NITROGLYCERIN 20 MG/100ML
INJECTION INTRAVENOUS
Status: COMPLETED
Start: 2023-02-06 | End: 2023-02-06

## 2023-02-06 RX ORDER — ASPIRIN 81 MG/1
324 TABLET, CHEWABLE ORAL ONCE
Status: COMPLETED | OUTPATIENT
Start: 2023-02-06 | End: 2023-02-06

## 2023-02-06 RX ORDER — ASPIRIN 81 MG/1
324 TABLET, CHEWABLE ORAL ONCE
Status: DISCONTINUED | OUTPATIENT
Start: 2023-02-06 | End: 2023-02-07 | Stop reason: ALTCHOICE

## 2023-02-06 RX ORDER — HEPARIN SODIUM 5000 [USP'U]/ML
5000 INJECTION, SOLUTION INTRAVENOUS; SUBCUTANEOUS EVERY 12 HOURS SCHEDULED
Status: DISCONTINUED | OUTPATIENT
Start: 2023-02-06 | End: 2023-02-07

## 2023-02-06 RX ORDER — CHLORHEXIDINE GLUCONATE 4 G/100ML
30 SOLUTION TOPICAL
Status: ACTIVE | OUTPATIENT
Start: 2023-02-07 | End: 2023-02-07

## 2023-02-06 RX ORDER — ONDANSETRON 2 MG/ML
4 INJECTION INTRAMUSCULAR; INTRAVENOUS EVERY 6 HOURS PRN
Status: DISCONTINUED | OUTPATIENT
Start: 2023-02-06 | End: 2023-02-07

## 2023-02-06 RX ORDER — MORPHINE SULFATE 2 MG/ML
2 INJECTION, SOLUTION INTRAMUSCULAR; INTRAVENOUS EVERY 2 HOUR PRN
Status: DISCONTINUED | OUTPATIENT
Start: 2023-02-06 | End: 2023-02-07

## 2023-02-06 RX ORDER — SODIUM CHLORIDE 9 MG/ML
INJECTION, SOLUTION INTRAVENOUS
Status: COMPLETED | OUTPATIENT
Start: 2023-02-07 | End: 2023-02-06

## 2023-02-06 RX ORDER — VERAPAMIL HYDROCHLORIDE 2.5 MG/ML
INJECTION, SOLUTION INTRAVENOUS
Status: COMPLETED
Start: 2023-02-06 | End: 2023-02-06

## 2023-02-06 RX ORDER — ASPIRIN 81 MG/1
81 TABLET ORAL DAILY
Status: DISCONTINUED | OUTPATIENT
Start: 2023-02-06 | End: 2023-02-07

## 2023-02-06 RX ORDER — HEPARIN SODIUM 1000 [USP'U]/ML
INJECTION, SOLUTION INTRAVENOUS; SUBCUTANEOUS
Status: COMPLETED
Start: 2023-02-06 | End: 2023-02-06

## 2023-02-06 RX ORDER — LIDOCAINE HYDROCHLORIDE 20 MG/ML
INJECTION, SOLUTION EPIDURAL; INFILTRATION; INTRACAUDAL; PERINEURAL
Status: COMPLETED
Start: 2023-02-06 | End: 2023-02-06

## 2023-02-06 RX ORDER — MORPHINE SULFATE 4 MG/ML
4 INJECTION, SOLUTION INTRAMUSCULAR; INTRAVENOUS EVERY 2 HOUR PRN
Status: DISCONTINUED | OUTPATIENT
Start: 2023-02-06 | End: 2023-02-07

## 2023-02-06 RX ORDER — HYDRALAZINE HYDROCHLORIDE 20 MG/ML
10 INJECTION INTRAMUSCULAR; INTRAVENOUS EVERY 4 HOURS PRN
Status: DISCONTINUED | OUTPATIENT
Start: 2023-02-06 | End: 2023-02-07

## 2023-02-06 RX ORDER — MIDAZOLAM HYDROCHLORIDE 1 MG/ML
INJECTION INTRAMUSCULAR; INTRAVENOUS
Status: COMPLETED
Start: 2023-02-06 | End: 2023-02-06

## 2023-02-06 NOTE — PLAN OF CARE
Problem: Patient Centered Care  Goal: Patient preferences are identified and integrated in the patient's plan of care  Description: Interventions:  - What would you like us to know as we care for you?   - Provide timely, complete, and accurate information to patient/family  - Incorporate patient and family knowledge, values, beliefs, and cultural backgrounds into the planning and delivery of care  - Encourage patient/family to participate in care and decision-making at the level they choose  - Honor patient and family perspectives and choices  Outcome: Progressing     Problem: Patient/Family Goals  Goal: Patient/Family Long Term Goal  Description: Patient's Long Term Goal:     Interventions:  -   - See additional Care Plan goals for specific interventions  Outcome: Progressing  Goal: Patient/Family Short Term Goal  Description: Patient's Short Term Goal:     Interventions:   -   - See additional Care Plan goals for specific interventions  Outcome: Progressing     Problem: CARDIOVASCULAR - ADULT  Goal: Maintains optimal cardiac output and hemodynamic stability  Description: INTERVENTIONS:  - Monitor vital signs, rhythm, and trends  - Monitor for bleeding, hypotension and signs of decreased cardiac output  - Evaluate effectiveness of vasoactive medications to optimize hemodynamic stability  - Monitor arterial and/or venous puncture sites for bleeding and/or hematoma  - Assess quality of pulses, skin color and temperature  - Assess for signs of decreased coronary artery perfusion - ex.  Angina  - Evaluate fluid balance, assess for edema, trend weights  Outcome: Progressing  Goal: Absence of cardiac arrhythmias or at baseline  Description: INTERVENTIONS:  - Continuous cardiac monitoring, monitor vital signs, obtain 12 lead EKG if indicated  - Evaluate effectiveness of antiarrhythmic and heart rate control medications as ordered  - Initiate emergency measures for life threatening arrhythmias  - Monitor electrolytes and administer replacement therapy as ordered  Outcome: Progressing   Cath done, no intervention. Right wrist TR band, bled when air removed. Placed air back. Tolerating diet. Family at bedside. 1800-Slight hematoma at site, resolved . TR band removed. Sarah Alston and tegaderm dressing Danica VILLALOBOS notified. Jarrod Lipoma

## 2023-02-06 NOTE — ED QUICK NOTES
Orders for admission, patient is aware of plan and ready to go upstairs. Any questions, please call ED RN Adilene Ham at extension [phone].      Patient Covid vaccination status: Fully vaccinated     COVID Test Ordered in ED: None    COVID Suspicion at Admission: N/A    Running Infusions:  None    Mental Status/LOC at time of transport: a/o x 4    Other pertinent information: Exertional chest tightness with abnormal ekg  CIWA score: N/A   NIH score:  N/A

## 2023-02-06 NOTE — ED INITIAL ASSESSMENT (HPI)
Pt here with c/o chest pain x4 weeks, pt reports working out this AM when he noted chest pain was exacerbated. denied any SOB.

## 2023-02-07 ENCOUNTER — APPOINTMENT (OUTPATIENT)
Dept: CV DIAGNOSTICS | Facility: HOSPITAL | Age: 58
End: 2023-02-07
Attending: INTERNAL MEDICINE
Payer: COMMERCIAL

## 2023-02-07 VITALS
SYSTOLIC BLOOD PRESSURE: 131 MMHG | WEIGHT: 221.31 LBS | BODY MASS INDEX: 28.4 KG/M2 | RESPIRATION RATE: 18 BRPM | HEIGHT: 74 IN | HEART RATE: 56 BPM | DIASTOLIC BLOOD PRESSURE: 74 MMHG | TEMPERATURE: 98 F | OXYGEN SATURATION: 98 %

## 2023-02-07 LAB
% OF MAX PREDICTED HR: 100 %
ANION GAP SERPL CALC-SCNC: 4 MMOL/L (ref 0–18)
BASOPHILS # BLD AUTO: 0.02 X10(3) UL (ref 0–0.2)
BASOPHILS NFR BLD AUTO: 0.6 %
BUN BLD-MCNC: 16 MG/DL (ref 7–18)
BUN/CREAT SERPL: 12.9 (ref 10–20)
CALCIUM BLD-MCNC: 8.7 MG/DL (ref 8.5–10.1)
CHLORIDE SERPL-SCNC: 107 MMOL/L (ref 98–112)
CO2 SERPL-SCNC: 28 MMOL/L (ref 21–32)
CREAT BLD-MCNC: 1.24 MG/DL
DEPRECATED RDW RBC AUTO: 42.1 FL (ref 35.1–46.3)
EOSINOPHIL # BLD AUTO: 0.03 X10(3) UL (ref 0–0.7)
EOSINOPHIL NFR BLD AUTO: 1 %
ERYTHROCYTE [DISTWIDTH] IN BLOOD BY AUTOMATED COUNT: 12.6 % (ref 11–15)
GFR SERPLBLD BASED ON 1.73 SQ M-ARVRAT: 67 ML/MIN/1.73M2 (ref 60–?)
GLUCOSE BLD-MCNC: 146 MG/DL (ref 70–99)
HCT VFR BLD AUTO: 42.9 %
HGB BLD-MCNC: 13.3 G/DL
IMM GRANULOCYTES # BLD AUTO: 0 X10(3) UL (ref 0–1)
IMM GRANULOCYTES NFR BLD: 0 %
LYMPHOCYTES # BLD AUTO: 0.91 X10(3) UL (ref 1–4)
LYMPHOCYTES NFR BLD AUTO: 29 %
MAX DIASTOLIC BP: 80 MMHG
MAX HEART RATE: 142 BPM
MAX PREDICTED HEART RATE: 160 BPM
MAX SYSTOLIC BP: 202 MMHG
MAX WORK LOAD: 156
MCH RBC QN AUTO: 27.9 PG (ref 26–34)
MCHC RBC AUTO-ENTMCNC: 31 G/DL (ref 31–37)
MCV RBC AUTO: 89.9 FL
MONOCYTES # BLD AUTO: 0.14 X10(3) UL (ref 0.1–1)
MONOCYTES NFR BLD AUTO: 4.5 %
NEUTROPHILS # BLD AUTO: 2.04 X10 (3) UL (ref 1.5–7.7)
NEUTROPHILS # BLD AUTO: 2.04 X10(3) UL (ref 1.5–7.7)
NEUTROPHILS NFR BLD AUTO: 64.9 %
OSMOLALITY SERPL CALC.SUM OF ELEC: 292 MOSM/KG (ref 275–295)
PLATELET # BLD AUTO: 222 10(3)UL (ref 150–450)
POTASSIUM SERPL-SCNC: 4.1 MMOL/L (ref 3.5–5.1)
RBC # BLD AUTO: 4.77 X10(6)UL
SODIUM SERPL-SCNC: 139 MMOL/L (ref 136–145)
WBC # BLD AUTO: 3.1 X10(3) UL (ref 4–11)

## 2023-02-07 PROCEDURE — 93017 CV STRESS TEST TRACING ONLY: CPT | Performed by: INTERNAL MEDICINE

## 2023-02-07 PROCEDURE — 99239 HOSP IP/OBS DSCHRG MGMT >30: CPT | Performed by: HOSPITALIST

## 2023-02-07 RX ORDER — AMLODIPINE BESYLATE 5 MG/1
5 TABLET ORAL DAILY
Qty: 30 TABLET | Refills: 0 | Status: SHIPPED | OUTPATIENT
Start: 2023-02-08

## 2023-02-07 RX ORDER — ASPIRIN 81 MG/1
81 TABLET ORAL DAILY
Qty: 30 TABLET | Refills: 0 | Status: SHIPPED | OUTPATIENT
Start: 2023-02-07

## 2023-02-07 NOTE — PLAN OF CARE
R wrist site is clean, dry intact. Denies chest pain. Stress test completed today. Patient medically cleared for discharge. Tele and IV removed. Education completed. Problem: Patient Centered Care  Goal: Patient preferences are identified and integrated in the patient's plan of care  Description: Interventions:  - What would you like us to know as we care for you?  I live at home with my wife  - Provide timely, complete, and accurate information to patient/family  - Incorporate patient and family knowledge, values, beliefs, and cultural backgrounds into the planning and delivery of care  - Encourage patient/family to participate in care and decision-making at the level they choose  - Honor patient and family perspectives and choices  2/7/2023 1206 by Shen Cano  Outcome: Progressing  2/7/2023 1205 by Shen Cano  Outcome: Progressing     Problem: Patient/Family Goals  Goal: Patient/Family Long Term Goal  Description: Patient's Long Term Goal: Go home     Interventions:  - Cardiac cath  -Stress test  - See additional Care Plan goals for specific interventions  2/7/2023 1206 by Shen Cano  Outcome: Progressing  2/7/2023 1205 by Shen Cano  Outcome: Progressing  Goal: Patient/Family Short Term Goal  Description: Patient's Short Term Goal: Understand why I was having chest pain     Interventions:   - Cardiac cath  -Stress test  - See additional Care Plan goals for specific interventions  2/7/2023 1206 by Shen Cano  Outcome: Progressing  2/7/2023 1205 by Shen Cano  Outcome: Progressing     Problem: CARDIOVASCULAR - ADULT  Goal: Maintains optimal cardiac output and hemodynamic stability  Description: INTERVENTIONS:  - Monitor vital signs, rhythm, and trends  - Monitor for bleeding, hypotension and signs of decreased cardiac output  - Evaluate effectiveness of vasoactive medications to optimize hemodynamic stability  - Monitor arterial and/or venous puncture sites for bleeding and/or hematoma  - Assess quality of pulses, skin color and temperature  - Assess for signs of decreased coronary artery perfusion - ex.  Angina  - Evaluate fluid balance, assess for edema, trend weights  2/7/2023 1206 by Dorota Rivero  Outcome: Progressing  2/7/2023 1205 by Dorota Rivero  Outcome: Progressing  Goal: Absence of cardiac arrhythmias or at baseline  Description: INTERVENTIONS:  - Continuous cardiac monitoring, monitor vital signs, obtain 12 lead EKG if indicated  - Evaluate effectiveness of antiarrhythmic and heart rate control medications as ordered  - Initiate emergency measures for life threatening arrhythmias  - Monitor electrolytes and administer replacement therapy as ordered  2/7/2023 1206 by Dorota Rivero  Outcome: Progressing  2/7/2023 1205 by Dorota Rivero  Outcome: Progressing

## 2023-02-07 NOTE — PLAN OF CARE
Problem: Patient Centered Care  Goal: Patient preferences are identified and integrated in the patient's plan of care  Description: Interventions:  - What would you like us to know as we care for you?  I live at home with my wife  - Provide timely, complete, and accurate information to patient/family  - Incorporate patient and family knowledge, values, beliefs, and cultural backgrounds into the planning and delivery of care  - Encourage patient/family to participate in care and decision-making at the level they choose  - Honor patient and family perspectives and choices  2/7/2023 1606 by Kira Flores  Outcome: Completed  2/7/2023 1206 by Kira Flores  Outcome: Progressing  2/7/2023 1205 by Kira Flores  Outcome: Progressing     Problem: Patient/Family Goals  Goal: Patient/Family Long Term Goal  Description: Patient's Long Term Goal: Go home     Interventions:  - Cardiac cath  -Stress test  - See additional Care Plan goals for specific interventions  2/7/2023 1606 by Kira Flores  Outcome: Completed  2/7/2023 1206 by Kira Flores  Outcome: Progressing  2/7/2023 1205 by Kira Flores  Outcome: Progressing  Goal: Patient/Family Short Term Goal  Description: Patient's Short Term Goal: Understand why I was having chest pain     Interventions:   - Cardiac cath  -Stress test  - See additional Care Plan goals for specific interventions  2/7/2023 1606 by Kira Flores  Outcome: Completed  2/7/2023 1206 by Kira Flores  Outcome: Progressing  2/7/2023 1205 by Kira Flores  Outcome: Progressing     Problem: CARDIOVASCULAR - ADULT  Goal: Maintains optimal cardiac output and hemodynamic stability  Description: INTERVENTIONS:  - Monitor vital signs, rhythm, and trends  - Monitor for bleeding, hypotension and signs of decreased cardiac output  - Evaluate effectiveness of vasoactive medications to optimize hemodynamic stability  - Monitor arterial and/or venous puncture sites for bleeding and/or hematoma  - Assess quality of pulses, skin color and temperature  - Assess for signs of decreased coronary artery perfusion - ex.  Angina  - Evaluate fluid balance, assess for edema, trend weights  2/7/2023 1606 by Kira Flores  Outcome: Completed  2/7/2023 1206 by Kira Flores  Outcome: Progressing  2/7/2023 1205 by Kira Flores  Outcome: Progressing  Goal: Absence of cardiac arrhythmias or at baseline  Description: INTERVENTIONS:  - Continuous cardiac monitoring, monitor vital signs, obtain 12 lead EKG if indicated  - Evaluate effectiveness of antiarrhythmic and heart rate control medications as ordered  - Initiate emergency measures for life threatening arrhythmias  - Monitor electrolytes and administer replacement therapy as ordered  2/7/2023 1606 by Kira Flores  Outcome: Completed  2/7/2023 1206 by Kira Flores  Outcome: Progressing  2/7/2023 1205 by Kira Flores  Outcome: Progressing

## 2023-02-07 NOTE — DISCHARGE INSTRUCTIONS
To whom it may concern:     Mr. Belgica Pantoja is to remain in light duty for the next 5 days given recent work up. That entails no heavy lifting. Patient can return to full duty 2/12/23. Thank ou for your understanding.      Veena BRAGG

## 2023-02-07 NOTE — PLAN OF CARE
AxOx4, RA, independent. HR in the 50s. Echo pending. No pain or SOB. Call light in reach. Will cont to monitor. Problem: CARDIOVASCULAR - ADULT  Goal: Maintains optimal cardiac output and hemodynamic stability  Description: INTERVENTIONS:  - Monitor vital signs, rhythm, and trends  - Monitor for bleeding, hypotension and signs of decreased cardiac output  - Evaluate effectiveness of vasoactive medications to optimize hemodynamic stability  - Monitor arterial and/or venous puncture sites for bleeding and/or hematoma  - Assess quality of pulses, skin color and temperature  - Assess for signs of decreased coronary artery perfusion - ex.  Angina  - Evaluate fluid balance, assess for edema, trend weights  Outcome: Progressing  Goal: Absence of cardiac arrhythmias or at baseline  Description: INTERVENTIONS:  - Continuous cardiac monitoring, monitor vital signs, obtain 12 lead EKG if indicated  - Evaluate effectiveness of antiarrhythmic and heart rate control medications as ordered  - Initiate emergency measures for life threatening arrhythmias  - Monitor electrolytes and administer replacement therapy as ordered  Outcome: Progressing numerical 0-10

## 2023-02-08 ENCOUNTER — PATIENT OUTREACH (OUTPATIENT)
Dept: CASE MANAGEMENT | Age: 58
End: 2023-02-08

## 2023-02-08 NOTE — PROGRESS NOTES
Left message on mailbox for pt to call NCM back for TCM. NCM contact information included in message. Follow up appointments:     Follow up With Specialties Details Why Contact Info   Yessy Qureshi MD CARDIOLOGY, Cardiac Electrophysiology Follow up on 2/22/2023 at 8 am [address] [facility] [address] Refugio Quiroz, DO Family Medicine Follow up  [address] [facility] [phone]        Future Appointments   Date Time Provider Archie Gomez   3/14/2023  4:00 PM Lenora Sylvester MD [facility]   4/17/2023  3:45 PM DanialMatt APRN [facility]

## 2023-02-09 NOTE — PROGRESS NOTES
Attempted to reach the patient to complete a [facility] FU call. Left a message for the pt to call the NCM back at, [phone].

## 2023-04-14 ENCOUNTER — TELEPHONE (OUTPATIENT)
Dept: SURGERY | Facility: CLINIC | Age: 58
End: 2023-04-14

## 2023-04-14 NOTE — TELEPHONE ENCOUNTER
-LMTCB on VM with pt's greeting. -OV 4/17/23 @ 3:45pm with [facility] needs to be michael with [facility] on another day or Acuna Livers APN on same day. -Outcome pending.

## 2023-08-17 ENCOUNTER — TELEPHONE (OUTPATIENT)
Dept: FAMILY MEDICINE CLINIC | Facility: CLINIC | Age: 58
End: 2023-08-17

## 2023-08-17 DIAGNOSIS — Z00.00 ROUTINE GENERAL MEDICAL EXAMINATION AT A HEALTH CARE FACILITY: Primary | ICD-10-CM

## 2023-08-17 NOTE — TELEPHONE ENCOUNTER
Patient is scheduled for a physical on 8/31 and is requesting labs to be ordered prior.  Per patient the provider always places the orders prior to the physical.

## 2023-08-25 ENCOUNTER — LAB ENCOUNTER (OUTPATIENT)
Dept: LAB | Age: 58
End: 2023-08-25
Attending: FAMILY MEDICINE
Payer: COMMERCIAL

## 2023-08-25 DIAGNOSIS — Z00.00 ROUTINE GENERAL MEDICAL EXAMINATION AT A HEALTH CARE FACILITY: ICD-10-CM

## 2023-08-25 LAB
ALBUMIN SERPL-MCNC: 3.6 G/DL (ref 3.4–5)
ALBUMIN/GLOB SERPL: 0.9 {RATIO} (ref 1–2)
ALP LIVER SERPL-CCNC: 54 U/L
ALT SERPL-CCNC: 18 U/L
ANION GAP SERPL CALC-SCNC: 6 MMOL/L (ref 0–18)
AST SERPL-CCNC: 31 U/L (ref 15–37)
BASOPHILS # BLD AUTO: 0.02 X10(3) UL (ref 0–0.2)
BASOPHILS NFR BLD AUTO: 0.6 %
BILIRUB SERPL-MCNC: 1.3 MG/DL (ref 0.1–2)
BILIRUB UR QL: NEGATIVE
BUN BLD-MCNC: 27 MG/DL (ref 7–18)
BUN/CREAT SERPL: 19.4 (ref 10–20)
CALCIUM BLD-MCNC: 9.1 MG/DL (ref 8.5–10.1)
CHLORIDE SERPL-SCNC: 107 MMOL/L (ref 98–112)
CHOLEST SERPL-MCNC: 180 MG/DL (ref ?–200)
CLARITY UR: CLEAR
CO2 SERPL-SCNC: 27 MMOL/L (ref 21–32)
COMPLEXED PSA SERPL-MCNC: 16 NG/ML (ref ?–4)
CREAT BLD-MCNC: 1.39 MG/DL
DEPRECATED RDW RBC AUTO: 43.3 FL (ref 35.1–46.3)
EGFRCR SERPLBLD CKD-EPI 2021: 58 ML/MIN/1.73M2 (ref 60–?)
EOSINOPHIL # BLD AUTO: 0.01 X10(3) UL (ref 0–0.7)
EOSINOPHIL NFR BLD AUTO: 0.3 %
ERYTHROCYTE [DISTWIDTH] IN BLOOD BY AUTOMATED COUNT: 12.8 % (ref 11–15)
FASTING PATIENT LIPID ANSWER: YES
FASTING STATUS PATIENT QL REPORTED: YES
GLOBULIN PLAS-MCNC: 3.9 G/DL (ref 2.8–4.4)
GLUCOSE BLD-MCNC: 79 MG/DL (ref 70–99)
GLUCOSE UR-MCNC: NORMAL MG/DL
HCT VFR BLD AUTO: 44.4 %
HDLC SERPL-MCNC: 83 MG/DL (ref 40–59)
HGB BLD-MCNC: 13.7 G/DL
HGB UR QL STRIP.AUTO: NEGATIVE
IMM GRANULOCYTES # BLD AUTO: 0 X10(3) UL (ref 0–1)
IMM GRANULOCYTES NFR BLD: 0 %
KETONES UR-MCNC: NEGATIVE MG/DL
LDLC SERPL CALC-MCNC: 89 MG/DL (ref ?–100)
LEUKOCYTE ESTERASE UR QL STRIP.AUTO: NEGATIVE
LYMPHOCYTES # BLD AUTO: 1.31 X10(3) UL (ref 1–4)
LYMPHOCYTES NFR BLD AUTO: 40.8 %
MCH RBC QN AUTO: 28.4 PG (ref 26–34)
MCHC RBC AUTO-ENTMCNC: 30.9 G/DL (ref 31–37)
MCV RBC AUTO: 92.1 FL
MONOCYTES # BLD AUTO: 0.21 X10(3) UL (ref 0.1–1)
MONOCYTES NFR BLD AUTO: 6.5 %
NEUTROPHILS # BLD AUTO: 1.66 X10 (3) UL (ref 1.5–7.7)
NEUTROPHILS # BLD AUTO: 1.66 X10(3) UL (ref 1.5–7.7)
NEUTROPHILS NFR BLD AUTO: 51.8 %
NITRITE UR QL STRIP.AUTO: NEGATIVE
NONHDLC SERPL-MCNC: 97 MG/DL (ref ?–130)
OSMOLALITY SERPL CALC.SUM OF ELEC: 294 MOSM/KG (ref 275–295)
PH UR: 5.5 [PH] (ref 5–8)
PLATELET # BLD AUTO: 249 10(3)UL (ref 150–450)
POTASSIUM SERPL-SCNC: 4.3 MMOL/L (ref 3.5–5.1)
PROT SERPL-MCNC: 7.5 G/DL (ref 6.4–8.2)
PROT UR-MCNC: NEGATIVE MG/DL
RBC # BLD AUTO: 4.82 X10(6)UL
SODIUM SERPL-SCNC: 140 MMOL/L (ref 136–145)
SP GR UR STRIP: 1.02 (ref 1–1.03)
TRIGL SERPL-MCNC: 40 MG/DL (ref 30–149)
UROBILINOGEN UR STRIP-ACNC: NORMAL
VLDLC SERPL CALC-MCNC: 6 MG/DL (ref 0–30)
WBC # BLD AUTO: 3.2 X10(3) UL (ref 4–11)

## 2023-08-25 PROCEDURE — 36415 COLL VENOUS BLD VENIPUNCTURE: CPT

## 2023-08-25 PROCEDURE — 81003 URINALYSIS AUTO W/O SCOPE: CPT

## 2023-08-25 PROCEDURE — 80053 COMPREHEN METABOLIC PANEL: CPT

## 2023-08-25 PROCEDURE — 80061 LIPID PANEL: CPT

## 2023-08-25 PROCEDURE — 85025 COMPLETE CBC W/AUTO DIFF WBC: CPT

## 2023-08-31 ENCOUNTER — OFFICE VISIT (OUTPATIENT)
Dept: FAMILY MEDICINE CLINIC | Facility: CLINIC | Age: 58
End: 2023-08-31

## 2023-08-31 VITALS
HEART RATE: 51 BPM | BODY MASS INDEX: 27.72 KG/M2 | SYSTOLIC BLOOD PRESSURE: 152 MMHG | HEIGHT: 74 IN | RESPIRATION RATE: 18 BRPM | DIASTOLIC BLOOD PRESSURE: 70 MMHG | WEIGHT: 216 LBS | OXYGEN SATURATION: 97 %

## 2023-08-31 DIAGNOSIS — Z00.00 ROUTINE GENERAL MEDICAL EXAMINATION AT A HEALTH CARE FACILITY: Primary | ICD-10-CM

## 2023-08-31 DIAGNOSIS — N28.9 RENAL INSUFFICIENCY: ICD-10-CM

## 2023-08-31 DIAGNOSIS — G89.29 CHRONIC LEFT SHOULDER PAIN: ICD-10-CM

## 2023-08-31 DIAGNOSIS — I10 ESSENTIAL HYPERTENSION WITH GOAL BLOOD PRESSURE LESS THAN 130/85: ICD-10-CM

## 2023-08-31 DIAGNOSIS — R97.20 ELEVATED PSA: ICD-10-CM

## 2023-08-31 DIAGNOSIS — M25.512 CHRONIC LEFT SHOULDER PAIN: ICD-10-CM

## 2023-08-31 PROBLEM — I20.89 STABLE ANGINA PECTORIS: Status: RESOLVED | Noted: 2023-02-06 | Resolved: 2023-08-31

## 2023-08-31 PROBLEM — I20.8 STABLE ANGINA PECTORIS (HCC): Status: RESOLVED | Noted: 2023-02-06 | Resolved: 2023-08-31

## 2023-08-31 PROBLEM — I20.89 STABLE ANGINA PECTORIS (HCC): Status: RESOLVED | Noted: 2023-02-06 | Resolved: 2023-08-31

## 2023-08-31 PROCEDURE — 3077F SYST BP >= 140 MM HG: CPT | Performed by: FAMILY MEDICINE

## 2023-08-31 PROCEDURE — 3078F DIAST BP <80 MM HG: CPT | Performed by: FAMILY MEDICINE

## 2023-08-31 PROCEDURE — 3008F BODY MASS INDEX DOCD: CPT | Performed by: FAMILY MEDICINE

## 2023-08-31 PROCEDURE — 99213 OFFICE O/P EST LOW 20 MIN: CPT | Performed by: FAMILY MEDICINE

## 2023-08-31 RX ORDER — AMLODIPINE BESYLATE 5 MG/1
5 TABLET ORAL DAILY
Qty: 30 TABLET | Refills: 11 | Status: SHIPPED | OUTPATIENT
Start: 2023-08-31

## 2023-09-07 ENCOUNTER — TELEPHONE (OUTPATIENT)
Dept: SURGERY | Facility: CLINIC | Age: 58
End: 2023-09-07

## 2023-09-07 NOTE — TELEPHONE ENCOUNTER
I tried to reach pt and LMTCB to confirm whether or not he can accept an appt for consult with [facility] for his elevated PSA.  I placed him in the slot for Mon 9/11 at 3 pm.

## 2023-09-11 ENCOUNTER — OFFICE VISIT (OUTPATIENT)
Dept: SURGERY | Facility: CLINIC | Age: 58
End: 2023-09-11

## 2023-09-11 DIAGNOSIS — R97.20 ELEVATED PSA: ICD-10-CM

## 2023-09-11 DIAGNOSIS — R39.9 LOWER URINARY TRACT SYMPTOMS: Primary | ICD-10-CM

## 2023-09-11 NOTE — PROGRESS NOTES
López Luna MD  Department of Urology  [facility]    T: [phone]  F: [phone]    To: Mark Franklin DO   [address] [facility]    Re: Ayaka Dickson   MRN: [id number]  : 1965    Dear Mark Franklin DO,    Today I had the pleasure of seeing Ayaka Dickson in my clinic. As you know, Mr. Lanny Mcclellan is a pleasant 59year old year old male who I am seeing for elevated PSA. Patient was last seen in this department on 2022. Briefly, patient has had an elevated PSA since . It has risen to as high as 22 in 2022. Currently it is 16. Urine analysis in August was negative. He is not on any prostate medications. He has followed with Kae claire and Dr. Shanell Karimi in the past.  He had a prostate biopsy in 2020 which was negative, an MRI in 2020 with a PI-RADS 3 lesion and a repeat MRI in 2022 with a PI-RADS 2 lesion. His PSA has remained \"stable\".        PSA PSA Screen   Latest Ref Rng <=4.00 ng/mL <=4.00 ng/mL   2015 5.4 (H)     2016 5.2 (H)     2017 5.7 (H)     2018 4.0      4.0     2019  7.32 (H)    9/3/2020 13.00 (H)     2020 8.20 (H)     2021 18.00 (H)     2021 18.60 (H)     2021 8.83 (H)     3/17/2022 22.10 (H)     2022 11.50 (H)     2022 13.90 (H)     2023  16.00 (H)       Legend:  (H) High       PAST MEDICAL HISTORY:  Past Medical History:   Diagnosis Date    High blood pressure     Unspecified essential hypertension         PAST SURGICAL HISTORY:  Past Surgical History:   Procedure Laterality Date    COLONOSCOPY N/A 2017    Procedure: COLONOSCOPY;  Surgeon: Monae Toledo MD;  Location: [facility] ENDOSCOPY         ALLERGIES:  No Known Allergies      MEDICATIONS:  Current Outpatient Medications   Medication Instructions    amLODIPine (NORVASC) 5 mg, Oral, Daily        FAMILY HISTORY:  Family History   Problem Relation Age of Onset Hypertension Father     Heart Disorder Mother     Cancer Mother     Cancer Sister     Stroke Brother         SOCIAL HISTORY:  Social History     Socioeconomic History    Marital status:    Tobacco Use    Smoking status: Never    Smokeless tobacco: Never   Vaping Use    Vaping Use: Never used   Substance and Sexual Activity    Alcohol use: Yes     Alcohol/week: 6.0 standard drinks of alcohol     Types: 3 Cans of beer, 3 Shots of liquor per week     Comment: socially    Drug use: No          PHYSICAL EXAMINATION:  There were no vitals filed for this visit. CONSTITUTIONAL: No apparent distress, cooperative and communicative  NEUROLOGIC: Alert and oriented   HEAD: Normocephalic, atraumatic   EYES: Sclera non-icteric   ENT: Hearing intact, moist mucous membranes   NECK: No obvious goiter or masses   RESPIRATORY: Normal respiratory effort, Nonlabored breathing on room air  SKIN: No evident rashes   ABDOMEN: Soft, nontender, nondistended, no rebound tenderness, no guarding, no masses      REVIEW OF SYSTEMS:    A comprehensive 10-point review of systems was completed. Pertinent positives and negatives are noted in the the HPI. LABORATORY DATA:   PSA PSA Screen   Latest Ref Rng <=4.00 ng/mL <=4.00 ng/mL   5/4/2015 5.4 (H)     5/20/2016 5.2 (H)     6/14/2017 5.7 (H)     7/24/2018 4.0      4.0     9/12/2019  7.32 (H)    9/3/2020 13.00 (H)     12/7/2020 8.20 (H)     2/16/2021 18.00 (H)     4/1/2021 18.60 (H)     7/14/2021 8.83 (H)     3/17/2022 22.10 (H)     4/21/2022 11.50 (H)     8/18/2022 13.90 (H)     8/25/2023  16.00 (H)       Legend:  (H) High        IMAGING REVIEW:  Narrative   PROCEDURE: MULTIPARAMETRIC MRI OF THE PROSTATE (W+WO)     COMPARISON: [facility], MRI PROSTATE(W+WO) (CPT=72197), 9/21/2020, 8:13 AM.     INDICATIONS: History of elevated prostate specific antigen level. Previous prostate biopsy in January 2020 that negative for malignancy.      PSA HISTORY: 8.83 ng/mL on 07/14/2021, 18.6 ng/mL on 04/01/2021, 18.0 ng/mL on 02/16/2021, 8.2 ng/mL on 12/07/2020, 13.0 ng/mL on 09/03/2020, 7.32 ng/mL on 09/12/2018     TECHNIQUE: A complete multi-planar, multiparametric examination was performed at [address] without use of an endorectal coil to optimize visualization of suspected pathology. Images were obtained both before and after administration of intravenous  gadolinium-based contrast agent. FINDINGS:  LIMITATIONS: The examination is limited by patient motion. PROSTATE:  SIZE: The prostate gland measures 6.1 x 5.1 x 5.4 cm, for a calculated volume of 82.4 mL (unchanged). PSA DENSITY: 0.11 ng/mL2, previously 0.16 ng/mL2     PERIPHERAL ZONE: The peripheral zone is predominantly T2 hyperintense. There are no focal T2 hypointense lesions with restricted diffusion or hypervascularity. TRANSITION ZONE: Numerous well-circumscribed ovoid nodules are seen throughout the transition zone, compatible with benign prostatic hyperplasia. There are no ill-defined, T2 hypointense lesions. CENTRAL ZONE: Symmetric. OTHER PELVIC FINDINGS:  SEMINAL VESICLES: Symmetric and T2 hyperintense. URINARY BLADDER: No visible calculus or focal wall thickening. PELVIC NODES: No lymphadenopathy with short axis measurement greater than 0.8 cm of the para-aortic, paracaval, common iliac, internal iliac, external iliac, obturator, pararectal, presecral, or common femoral chains. BONES:   No significant bony lesion or fracture. BODY WALL: There is a stable small fat containing left inguinal hernia. OTHER: No free fluid is seen in the pelvis. Impression   CONCLUSION:  1. No suspicious lesions within the prostate gland to suggest a clinically significant prostate cancer. 2. No evidence of extracapsular extension of tumor, pelvic lymphadenopathy or osseous metastases. 3. Prostate gland remains moderately enlarged and demonstrates evidence of benign prostatic hypertrophy.      PI-RADS CLASSIFICATION:  PI-RADS 2 - Low (clinically significant cancer is unlikely to be present)       Prostate Imaging - Reporting and Data System version 2.1 (PI-RADS v2.1) Assessment Categories:     PI-RADS 1: Very low (clinically significant cancer is highly unlikely to be present)  PI-RADS 2: Low (clinically significant cancer is unlikely to be present)  PI-RADS 3: Intermediate (the presence of clinically significant cancer is equivocal)  PI-RADS 4: High (clinically significant cancer is likely to be present)  PI-RADS 5: Very high (clinically significant cancer is likely to be present)     Dictated by (CST): Beronica Harper MD on 3/28/2022 at 3:20 PM      Finalized by (CST): Beronica Harper MD on 3/28/2022 at 3:33 PM          OTHER RELEVANT DATA:   none     IMPRESSION: Elevated PSA as high as 22 since May 2015 with negative biopsy in January 2020. MRI in September 2020 demonstrated a PI-RADS 3 lesion. His prostate size was 81 g. His most recent MRI in March 2022 was negative for suspicious lesions and demonstrated a volume of 82 g. I discussed that there may be an underlying cancer however it is very reassuring that his MRI is negative. I offered him MRI guided biopsy of the PI-RADS 2/3 lesion plus standard core biopsy. I also offered him continued PSA checks and follow-up with Dr. Jennifer Shanks. If the PSA rises significantly can consider repeat MRI followed by biopsy at that time. Patient opted for  recheck of psa after avoiding heavy working out, bike riding, ejaculation and increasing hydration for several days prior to PSA collection  He will return to clinic after follow-up PSA in 2 months     PLAN:  PSA  Return to clinic after PSA in 2 months if still significantly elevated will plan for either repeat PSA as it is \"stable\", MRI guided biopsy versus biopsy    Thank you for referring this very pleasant patient to my clinic.  If you have any questions or concerns, please do not hesitate to contact me.    Sincerely,  Michelle Basilio MD    25 minutes were spent on this patient at this visit obtaining a history, reviewing medical records, developing a treatment plan, counseling and discussing treatment strategy with patient, coordination of care and documentation. The [id number] makes medical notes available to patients in the interest of transparency. However, please be advised that this is a medical document. It is intended as a peer to peer communication. It is written in medical language and may contain abbreviations or verbiage that are technical and unfamiliar. It may appear blunt or direct. Medical documents are intended to carry relevant information, facts as evident, and the clinical opinion of the practitioner.

## 2023-11-06 ENCOUNTER — LAB ENCOUNTER (OUTPATIENT)
Dept: LAB | Age: 58
End: 2023-11-06
Attending: UROLOGY
Payer: COMMERCIAL

## 2023-11-06 DIAGNOSIS — R97.20 ELEVATED PSA: ICD-10-CM

## 2023-11-06 LAB
PSA SERPL-MCNC: 15.26 NG/ML (ref ?–4)
PSA SERPL-MCNC: 22.5 NG/ML (ref ?–4)

## 2023-11-06 PROCEDURE — 36415 COLL VENOUS BLD VENIPUNCTURE: CPT

## 2023-11-06 PROCEDURE — 84153 ASSAY OF PSA TOTAL: CPT

## 2023-11-13 ENCOUNTER — OFFICE VISIT (OUTPATIENT)
Dept: SURGERY | Facility: CLINIC | Age: 58
End: 2023-11-13

## 2023-11-13 DIAGNOSIS — R97.20 ELEVATED PSA: Primary | ICD-10-CM

## 2023-11-13 PROCEDURE — 99213 OFFICE O/P EST LOW 20 MIN: CPT | Performed by: UROLOGY

## 2023-11-13 NOTE — PROGRESS NOTES
Elodia Benitez MD  Department of Urology  [facility], [place]    T: [phone]  F: [phone]    To: Kim Osullivan DO   [address] [facility]    Re: Poornima Evans   MRN: [id number]  : 1965    Dear Kim Osullivan DO,    Today I had the pleasure of seeing Poornima Evans in my clinic. As you know, Mr. Tatyana Borges is a pleasant 59year old year old male who I am seeing for elevated psa. Patient was last seen in this department on 2023. Briefly, Briefly, patient has had an elevated PSA since . It has risen to as high as 22 in 2022. Urine analysis in August was negative. He is not on any prostate medications. He has followed with Usha claire and Dr. Jayson So in the past.  He had a prostate biopsy in 2020 which was negative, an MRI in 2020 with a PI-RADS 3 lesion and a repeat MRI in 2022 with a PI-RADS 2 lesion. His PSA has remained \"stable\" at last visit but is now increased to 22.50.      PSA Total PSA (IM) PSA Screen   Latest Ref Rng <=4.00 ng/mL <=4.00 ng/mL <=4.00 ng/mL   2015 5.4 (H)      2016 5.2 (H)      2017 5.7 (H)      2018 4.0       4.0      2019   7.32 (H)    9/3/2020 13.00 (H)      2020 8.20 (H)      2021 18.00 (H)      2021 18.60 (H)      2021 8.83 (H)      3/17/2022 22.10 (H)      2022 11.50 (H)      2022 13.90 (H)      2023   16.00 (H)    2023 22.50 (H)  15.26 (H)        Legend:  (H) High       PAST MEDICAL HISTORY:  Past Medical History:   Diagnosis Date    High blood pressure     Unspecified essential hypertension         PAST SURGICAL HISTORY:  Past Surgical History:   Procedure Laterality Date    COLONOSCOPY N/A 2017    Procedure: COLONOSCOPY;  Surgeon: Chrystal Santiago MD;  Location: [facility] ENDOSCOPY         ALLERGIES:  No Known Allergies      MEDICATIONS:  Current Outpatient Medications   Medication Instructions    amLODIPine (NORVASC) 5 mg, Oral, Daily        FAMILY HISTORY:  Family History   Problem Relation Age of Onset    Hypertension Father     Heart Disorder Mother     Cancer Mother     Cancer Sister     Stroke Brother         SOCIAL HISTORY:  Social History     Socioeconomic History    Marital status:    Tobacco Use    Smoking status: Never    Smokeless tobacco: Never   Vaping Use    Vaping Use: Never used   Substance and Sexual Activity    Alcohol use: Yes     Alcohol/week: 6.0 standard drinks of alcohol     Types: 3 Cans of beer, 3 Shots of liquor per week     Comment: socially    Drug use: No          PHYSICAL EXAMINATION:  There were no vitals filed for this visit. CONSTITUTIONAL: No apparent distress, cooperative and communicative  NEUROLOGIC: Alert and oriented   HEAD: Normocephalic, atraumatic   EYES: Sclera non-icteric   ENT: Hearing intact, moist mucous membranes   NECK: No obvious goiter or masses   RESPIRATORY: Normal respiratory effort, Nonlabored breathing on room air  SKIN: No evident rashes   ABDOMEN: Soft, nontender, nondistended, no rebound tenderness, no guarding, no masses      REVIEW OF SYSTEMS:    A comprehensive 10-point review of systems was completed. Pertinent positives and negatives are noted in the the HPI. LABORATORY DATA:  PSA  <=4.00 ng/mL 22.50 High    Comment: Patient results determined by assays using different manufacturers or methods may not be comparable. Total PSA (IM)  <=4.00 ng/mL 15.26 High            IMAGING REVIEW:  Narrative   PROCEDURE: MULTIPARAMETRIC MRI OF THE PROSTATE (W+WO)     COMPARISON: [facility], MRI PROSTATE(W+WO) (CPT=72197), 9/21/2020, 8:13 AM.     INDICATIONS: History of elevated prostate specific antigen level. Previous prostate biopsy in January 2020 that negative for malignancy.      PSA HISTORY: 8.83 ng/mL on 07/14/2021, 18.6 ng/mL on 04/01/2021, 18.0 ng/mL on 02/16/2021, 8.2 ng/mL on 12/07/2020, 13.0 ng/mL on 09/03/2020, 7.32 ng/mL on 09/12/2018     TECHNIQUE: A complete multi-planar, multiparametric examination was performed at 3 [place] without use of an endorectal coil to optimize visualization of suspected pathology. Images were obtained both before and after administration of intravenous  gadolinium-based contrast agent. FINDINGS:  LIMITATIONS: The examination is limited by patient motion. PROSTATE:  SIZE: The prostate gland measures 6.1 x 5.1 x 5.4 cm, for a calculated volume of 82.4 mL (unchanged). PSA DENSITY: 0.11 ng/mL2, previously 0.16 ng/mL2     PERIPHERAL ZONE: The peripheral zone is predominantly T2 hyperintense. There are no focal T2 hypointense lesions with restricted diffusion or hypervascularity. TRANSITION ZONE: Numerous well-circumscribed ovoid nodules are seen throughout the transition zone, compatible with benign prostatic hyperplasia. There are no ill-defined, T2 hypointense lesions. CENTRAL ZONE: Symmetric. OTHER PELVIC FINDINGS:  SEMINAL VESICLES: Symmetric and T2 hyperintense. URINARY BLADDER: No visible calculus or focal wall thickening. PELVIC NODES: No lymphadenopathy with short axis measurement greater than 0.8 cm of the para-aortic, paracaval, common iliac, internal iliac, external iliac, obturator, pararectal, presecral, or common femoral chains. BONES:   No significant bony lesion or fracture. BODY WALL: There is a stable small fat containing left inguinal hernia. OTHER: No free fluid is seen in the pelvis. Impression   CONCLUSION:  1. No suspicious lesions within the prostate gland to suggest a clinically significant prostate cancer. 2. No evidence of extracapsular extension of tumor, pelvic lymphadenopathy or osseous metastases. 3. Prostate gland remains moderately enlarged and demonstrates evidence of benign prostatic hypertrophy.      PI-RADS CLASSIFICATION:  PI-RADS 2 - Low (clinically significant cancer is unlikely to be present)       Prostate Imaging - Reporting and Data System version 2.1 (PI-RADS v2.1) Assessment Categories:     PI-RADS 1: Very low (clinically significant cancer is highly unlikely to be present)  PI-RADS 2: Low (clinically significant cancer is unlikely to be present)  PI-RADS 3: Intermediate (the presence of clinically significant cancer is equivocal)  PI-RADS 4: High (clinically significant cancer is likely to be present)  PI-RADS 5: Very high (clinically significant cancer is likely to be present)     Dictated by (CST): Don Richardson MD on 3/28/2022 at 3:20 PM      Finalized by (CST): Don Richardson MD on 3/28/2022 at 3:33 PM           Result History       OTHER RELEVANT DATA:   none     IMPRESSION:  Elevated PSA as high as 22 since May 2015 with negative biopsy in January 2020. MRI in September 2020 demonstrated a PI-RADS 3 lesion. His prostate size was 81 g. His most recent MRI in March 2022 was negative for suspicious lesions and demonstrated a volume of 82 g. I discussed that there may be an underlying cancer and given his almost doubled PSA I would recommend a repeat MRI and based on the findings proceed with biopsy     PLAN:  Prostate MRI  RTC after prostate MRI    Thank you for referring this very pleasant patient to my clinic. If you have any questions or concerns, please do not hesitate to contact me. Sincerely,  Aissatou Reyez MD    30 minutes were spent on this patient at this visit obtaining a history, reviewing medical records, developing a treatment plan, counseling and discussing treatment strategy with patient, coordination of care and documentation. The [facility] makes medical notes available to patients in the interest of transparency. However, please be advised that this is a medical document. It is intended as a peer to peer communication. It is written in medical language and may contain abbreviations or verbiage that are technical and unfamiliar. It may appear blunt or direct. Medical documents are intended to carry relevant information, facts as evident, and the clinical opinion of the practitioner.

## 2023-12-07 ENCOUNTER — TELEPHONE (OUTPATIENT)
Dept: SURGERY | Facility: CLINIC | Age: 58
End: 2023-12-07

## 2023-12-07 NOTE — TELEPHONE ENCOUNTER
Received a fax from [facility] with results from MRI prostate ww/o contrast. Result is in Epic.

## 2023-12-11 ENCOUNTER — OFFICE VISIT (OUTPATIENT)
Dept: SURGERY | Facility: CLINIC | Age: 58
End: 2023-12-11

## 2023-12-11 DIAGNOSIS — R97.20 ELEVATED PSA: Primary | ICD-10-CM

## 2023-12-11 RX ORDER — CIPROFLOXACIN 500 MG/1
500 TABLET, FILM COATED ORAL 2 TIMES DAILY
Qty: 6 TABLET | Refills: 0 | Status: SHIPPED | OUTPATIENT
Start: 2023-12-11 | End: 2023-12-11 | Stop reason: CLARIF

## 2023-12-11 RX ORDER — CEFDINIR 300 MG/1
300 CAPSULE ORAL EVERY 12 HOURS
Qty: 6 CAPSULE | Refills: 0 | Status: SHIPPED | OUTPATIENT
Start: 2023-12-11 | End: 2023-12-11 | Stop reason: CLARIF

## 2023-12-11 NOTE — PROGRESS NOTES
Robin Nguyen MD  [facility]    T: [phone]  F: [phone]    To: Andrei Joyce DO   [address] [facility]    Re: Kelly Velasco   MRN: [id number]  : 1965    Dear Andrei Joyce DO,    Today I had the pleasure of seeing Kelly Velasco in my clinic. As you know, Mr. Shahrzad Mohr is a pleasant 59year old year old male who I am seeing for follow up of MRI. Patient was last seen in this department on 2023. Briefly, patient has had an elevated PSA since . It has risen to as high as 22 in 2022. Urine analysis in August was negative. He is not on any prostate medications. He has followed with Renea claire and Dr. Sebastian Mejia in the past.  He had a prostate biopsy in 2020 which was negative, an MRI in 2020 with a PI-RADS 3 lesion and a repeat MRI in 2022 with a PI-RADS 2 lesion. His PSA has remained \"stable\" at last visit but is now increased to 22.50. PSA Total PSA (IM) PSA Screen   Latest Ref Rng <=4.00 ng/mL <=4.00 ng/mL <=4.00 ng/mL   2015 5.4 (H)        2016 5.2 (H)        2017 5.7 (H)        2018 4.0          4.0        2019     7.32 (H)    9/3/2020 13.00 (H)        2020 8.20 (H)        2021 18.00 (H)        2021 18.60 (H)        2021 8.83 (H)        3/17/2022 22.10 (H)        2022 11.50 (H)        2022 13.90 (H)        2023     16.00 (H)    2023 22.50 (H)  15.26 (H)         Legend:  (H) High    Last visit was to obtain a prostate MRI return to clinic after prostate MRI. His MRI of the prostate demonstrated an 82 g prostate with no concerning findings. He had no enlarged pelvic lymph nodes and no focal bony lesions.     PAST MEDICAL HISTORY:  Past Medical History:   Diagnosis Date    High blood pressure     Unspecified essential hypertension         PAST SURGICAL HISTORY:  Past Surgical History:   Procedure Laterality Date    COLONOSCOPY N/A 6/24/2017    Procedure: COLONOSCOPY;  Surgeon: Lucretia Padron MD;  Location[facility] ENDOSCOPY         ALLERGIES:  No Known Allergies      MEDICATIONS:  Current Outpatient Medications   Medication Instructions    amLODIPine (NORVASC) 5 mg, Oral, Daily        FAMILY HISTORY:  Family History   Problem Relation Age of Onset    Hypertension Father     Heart Disorder Mother     Cancer Mother     Cancer Sister     Stroke Brother         SOCIAL HISTORY:  Social History     Socioeconomic History    Marital status:    Tobacco Use    Smoking status: Never    Smokeless tobacco: Never   Vaping Use    Vaping Use: Never used   Substance and Sexual Activity    Alcohol use: Yes     Alcohol/week: 6.0 standard drinks of alcohol     Types: 3 Cans of beer, 3 Shots of liquor per week     Comment: socially    Drug use: No          PHYSICAL EXAMINATION:  There were no vitals filed for this visit. CONSTITUTIONAL: No apparent distress, cooperative and communicative  NEUROLOGIC: Alert and oriented   HEAD: Normocephalic, atraumatic   EYES: Sclera non-icteric   ENT: Hearing intact, moist mucous membranes   NECK: No obvious goiter or masses   RESPIRATORY: Normal respiratory effort, Nonlabored breathing on room air  SKIN: No evident rashes   ABDOMEN: Soft, nontender, nondistended, no rebound tenderness, no guarding, no masses      REVIEW OF SYSTEMS:    A comprehensive 10-point review of systems was completed. Pertinent positives and negatives are noted in the the HPI.        LABORATORY DATA:   PSA TOTAL PSA PSA Screen   Latest Ref Rng <=4.00 ng/mL <=4.00 ng/mL <=4.00 ng/mL   5/4/2015 5.4 (H)      5/20/2016 5.2 (H)      6/14/2017 5.7 (H)      7/24/2018 4.0       4.0      9/12/2019   7.32 (H)    9/3/2020 13.00 (H)      12/7/2020 8.20 (H)      2/16/2021 18.00 (H)      4/1/2021 18.60 (H)      7/14/2021 8.83 (H)      3/17/2022 22.10 (H)      4/21/2022 11.50 (H)      8/18/2022 13.90 (H)      8/25/2023   16.00 (H)    11/6/2023 22.50 (H)  15.26 (H)        Legend:  (H) High        IMAGING REVIEW:    Exam: MRI PROSTATE WW/O CONTRAST   CPT Code(s): 00475,10230,GDV10 - MRI PELVIS W/O  and  W/DYE,3D RENDER W/INTRP POSTPROCES,Gadavist 10 ML Vial     INDICATION: Elevated PSA measuring 22.50 ng/mL on 11/6/2023 which is increased from 13.90 ng/mL on 8/18/2022. COMPARISON: Report only of a prior 3/28/2022 prostate MRI study from [facility]. The prior report describes \"no suspicious lesions within the prostate gland\". TECHNIQUE: High-field strength 3.0 [place] MRI of the prostate performed before and after 10 cc Gadavist intravenous gadolinium. This exam was performed with a pelvic phase array coil with no endorectal coil utilized for this study. [employer]   multiparametric analysis software was utilized for image processing and interpretation. 3D segmentation modeling of the prostate gland was performed on an independent workstation using the [address] [place] [address] [email or web address]. FINDINGS:   The prostate volume measures 82 cc (PSA density = 0.27 ng/ml2). The enlarged prostate gland mildly indents the bladder base with moderate nonspecific bladder wall thickening. The seminal vesicles and neurovascular bundles are unremarkable. No focal suspicious prostatic signal abnormalities are identified. There is heterogeneous enlargement of the transitional zone with well encapsulated nodules consistent with benign prostatic hyperplasia. There is mild thinning of the peripheral zone with    T2 heterogeneity consistent with bilateral areas of prostatic inflammation/scarring. No abnormal areas restricted diffusion are identified within the peripheral zone. Extraprostatic findings: Scattered mild to moderate degenerative changes are present. There are small low signal intensity probable benign bone islands within the right posteromedial ilium (series 3, images 26-36). No other focal bone lesions are   identified.  There is mild partial tearing at the hamstring tendon origins bilaterally. The muscle and tendon structures are otherwise unremarkable in signal intensity. Normal caliber distal visualized abdominal aorta and iliac vessels. Normal caliber   pelvic bowel loops with no evidence of bowel wall thickening. Moderate sigmoid colon diverticulosis is identified. A small fat-containing left inguinal hernia is again identified as previously noted. There is no evidence of free fluid or lymphadenopathy   throughout the pelvis. IMPRESSION:     1. Prostate volume measures 82 cc. Moderate nonspecific bladder wall thickening. 2.No focal suspicious prostatic signal abnormalities. Benign prostatic hyperplasia. Bilateral areas of prostatic inflammation/scarring within the peripheral zone. 3.Mild to moderate degenerative changes. Small probable benign bone islands in the right posteromedial ilium. No other focal bone lesions. 4.Mild partial tearing at the hamstring tendon origins bilaterally. 5.Moderate sigmoid colon diverticulosis. Small fat-containing left inguinal hernia again noted. 6.No enlarged pelvic lymph nodes. Overall PI-RADS category 2     PI-RADS v.2.1 Assessment Categories   PI-RADS 1 - Very low (clinically significant cancer is highly unlikely to be present)   PI-RADS 2 - Low (clinically significant cancer is unlikely to be present)   PI-RADS 3 - Intermediate (the presence of clinically significant cancer is equivocal)   PI-RADS 4 - High (clinically significant cancer is likely to be present)   PI-RADS 5 - Very high (clinically significant cancer is highly likely to be present)     Interpreting Radiologist:     Russel Barriga. Baron Kenny AL Electronically Signed: 12/06/2023 05:27 PM      OTHER RELEVANT DATA:   none     IMPRESSION: elevated PSA with negative MRI. Given rising PSA >20, I still recommend biopsy, but did mention that continue observation is technically an option.  He opted for repeat PSA in 6-8 weeks with follow up after that point. If still elevated we may pursue repeat biopsy. Discussed risks of biopsy which include bleeding (hematospermia, hematochezia and hematuria), infection, sepsis, temporary erectile dysfunction, pelvic pain and possible death from infectious complications. The patient agreed to proceed. We will obtain a urine culture 1-2 weeks prior to the biopsy, and treat as indicated. He was written for levofloxaxin qd x3 to start 1 day prior to his biopsy - orders placed. PLAN:  PSA 6-8 weeks  RTC after PSA    Thank you for referring this very pleasant patient to my clinic. If you have any questions or concerns, please do not hesitate to contact me. Sincerely,  Aissatou Reyez MD    30 minutes were spent on this patient at this visit obtaining a history, reviewing medical records, developing a treatment plan, counseling and discussing treatment strategy with patient, coordination of care and documentation. The [facility] makes medical notes available to patients in the interest of transparency. However, please be advised that this is a medical document. It is intended as a peer to peer communication. It is written in medical language and may contain abbreviations or verbiage that are technical and unfamiliar. It may appear blunt or direct. Medical documents are intended to carry relevant information, facts as evident, and the clinical opinion of the practitioner.

## 2024-02-01 ENCOUNTER — LAB ENCOUNTER (OUTPATIENT)
Dept: LAB | Age: 59
End: 2024-02-01
Attending: UROLOGY
Payer: COMMERCIAL

## 2024-02-01 DIAGNOSIS — R97.20 ELEVATED PSA: ICD-10-CM

## 2024-02-01 LAB — PSA SERPL-MCNC: 8.36 NG/ML (ref ?–4)

## 2024-02-01 PROCEDURE — 36415 COLL VENOUS BLD VENIPUNCTURE: CPT

## 2024-02-01 PROCEDURE — 84153 ASSAY OF PSA TOTAL: CPT

## 2024-02-07 ENCOUNTER — OFFICE VISIT (OUTPATIENT)
Dept: SURGERY | Facility: CLINIC | Age: 59
End: 2024-02-07

## 2024-02-07 DIAGNOSIS — R97.20 ELEVATED PSA: Primary | ICD-10-CM

## 2024-02-07 PROCEDURE — 99213 OFFICE O/P EST LOW 20 MIN: CPT | Performed by: UROLOGY

## 2024-02-07 NOTE — PROGRESS NOTES
Chelsea Caal MD  Department of Urology  [address] [place] [address] Rd[address]  [place] [address]    T: [phone]  F: [phone]    To: Jessee Collins DO   [address] [facility] [address]    Re: Maxwell Wright   MRN: [id number]  : 1965    Dear Jessee Collins DO,    Today I had the pleasure of seeing Maxwell Wright in my clinic. As you know, Mr. Wright is a pleasant 59 year old year old male who I am seeing for elevated PSA. Patient was last seen in this department on 2023.    Briefly, patient has had an elevated PSA since .  It has risen to as high as 22 in 2022. Urine analysis in August was negative.  He is not on any prostate medications.  He has followed with [facility] cl[facility] and Dr. Harrison in the past.  He had a prostate biopsy in 2020 which was negative, an MRI in 2020 with a PI-RADS 3 lesion and a repeat MRI in 2022 with a PI-RADS 2 lesion.     His PSA has remained \"stable\" at last visit but is now increased to 22.50.       PSA Total PSA (IM) PSA Screen   Latest Ref Rng <=4.00 ng/mL <=4.00 ng/mL <=4.00 ng/mL   2015 5.4 (H)        2016 5.2 (H)        2017 5.7 (H)        2018 4.0          4.0        2019     7.32 (H)    9/3/2020 13.00 (H)        2020 8.20 (H)        2021 18.00 (H)        2021 18.60 (H)        2021 8.83 (H)        3/17/2022 22.10 (H)        2022 11.50 (H)        2022 13.90 (H)        2023     16.00 (H)    2023 22.50 (H)  15.26 (H)         Total PSA  <=4.00 ng/mL 8.36 High      Legend:  (H) High     Last visit was to obtain a prostate MRI return to clinic after prostate MRI.  His MRI of the prostate demonstrated an 82 g prostate with no concerning findings.  He had no enlarged pelvic lymph nodes and no focal bony lesions.       PAST MEDICAL HISTORY:  Past Medical History:   Diagnosis Date    High blood pressure     Unspecified essential hypertension         PAST SURGICAL  HISTORY:  Past Surgical History:   Procedure Laterality Date    COLONOSCOPY N/A 6/24/2017    Procedure: COLONOSCOPY;  Surgeon: Donnie Silver MD;  Location: [facility] ENDOSCOPY         ALLERGIES:  No Known Allergies      MEDICATIONS:  Current Outpatient Medications   Medication Instructions    amLODIPine (NORVASC) 5 mg, Oral, Daily        FAMILY HISTORY:  Family History   Problem Relation Age of Onset    Hypertension Father     Heart Disorder Mother     Cancer Mother     Cancer Sister     Stroke Brother         SOCIAL HISTORY:  Social History     Socioeconomic History    Marital status:    Tobacco Use    Smoking status: Never    Smokeless tobacco: Never   Vaping Use    Vaping Use: Never used   Substance and Sexual Activity    Alcohol use: Yes     Alcohol/week: 6.0 standard drinks of alcohol     Types: 3 Cans of beer, 3 Shots of liquor per week     Comment: socially    Drug use: No          PHYSICAL EXAMINATION:  There were no vitals filed for this visit.  CONSTITUTIONAL: No apparent distress, cooperative and communicative  NEUROLOGIC: Alert and oriented   HEAD: Normocephalic, atraumatic   EYES: Sclera non-icteric   ENT: Hearing intact, moist mucous membranes   NECK: No obvious goiter or masses   RESPIRATORY: Normal respiratory effort, Nonlabored breathing on room air  SKIN: No evident rashes   ABDOMEN: Soft, nontender, nondistended, no rebound tenderness, no guarding, no masses      REVIEW OF SYSTEMS:    A comprehensive 10-point review of systems was completed.  Pertinent positives and negatives are noted in the the HPI.       LABORATORY DATA:      Component  Ref Range & Units 2/1/24  1:19 PM   Total PSA  <=4.00 ng/mL 8.36 High            IMAGING REVIEW:  Exam: MRI PROSTATE WW/O CONTRAST   CPT Code(s): 42555,58355,GDV10 - MRI PELVIS W/O  and  W/DYE,3D RENDER W/INTRP POSTPROCES,Gadavist 10 ML Vial     INDICATION: Elevated PSA measuring 22.50 ng/mL on 11/6/2023 which is increased from 13.90 ng/mL on 8/18/2022.      COMPARISON: Report only of a prior 3/28/2022 prostate MRI study from [facility]. The prior report describes \"no suspicious lesions within the prostate gland\".     TECHNIQUE: High-field strength 3.0 Esperanza MRI of the prostate performed before and after 10 cc Gadavist intravenous gadolinium. This exam was performed with a pelvic phase array coil with no endorectal coil utilized for this study. [employer]   multiparametric analysis software was utilized for image processing and interpretation. 3D segmentation modeling of the prostate gland was performed on an independent workstation using the [employer] software.     FINDINGS:   The prostate volume measures 82 cc (PSA density = 0.27 ng/ml2). The enlarged prostate gland mildly indents the bladder base with moderate nonspecific bladder wall thickening. The seminal vesicles and neurovascular bundles are unremarkable.     No focal suspicious prostatic signal abnormalities are identified. There is heterogeneous enlargement of the transitional zone with well encapsulated nodules consistent with benign prostatic hyperplasia. There is mild thinning of the peripheral zone with    T2 heterogeneity consistent with bilateral areas of prostatic inflammation/scarring. No abnormal areas restricted diffusion are identified within the peripheral zone.     Extraprostatic findings: Scattered mild to moderate degenerative changes are present. There are small low signal intensity probable benign bone islands within the right posteromedial ilium (series 3, images 26-36). No other focal bone lesions are   identified. There is mild partial tearing at the hamstring tendon origins bilaterally. The muscle and tendon structures are otherwise unremarkable in signal intensity. Normal caliber distal visualized abdominal aorta and iliac vessels. Normal caliber   pelvic bowel loops with no evidence of bowel wall thickening. Moderate sigmoid colon diverticulosis is identified. A small  fat-containing left inguinal hernia is again identified as previously noted. There is no evidence of free fluid or lymphadenopathy   throughout the pelvis.     IMPRESSION:     1.Prostate volume measures 82 cc. Moderate nonspecific bladder wall thickening.   2.No focal suspicious prostatic signal abnormalities. Benign prostatic hyperplasia. Bilateral areas of prostatic inflammation/scarring within the peripheral zone.   3.Mild to moderate degenerative changes. Small probable benign bone islands in the right posteromedial ilium. No other focal bone lesions.   4.Mild partial tearing at the hamstring tendon origins bilaterally.   5.Moderate sigmoid colon diverticulosis. Small fat-containing left inguinal hernia again noted.   6.No enlarged pelvic lymph nodes.     Overall PI-RADS category 2     PI-RADS v.2.1 Assessment Categories   PI-RADS 1 - Very low (clinically significant cancer is highly unlikely to be present)   PI-RADS 2 - Low (clinically significant cancer is unlikely to be present)   PI-RADS 3 - Intermediate (the presence of clinically significant cancer is equivocal)   PI-RADS 4 - High (clinically significant cancer is likely to be present)   PI-RADS 5 - Very high (clinically significant cancer is highly likely to be present)     Interpreting Radiologist:     Jerry Raya M.D.   Electronically Signed: 12/06/2023 05:27 PM      OTHER RELEVANT DATA:   none     IMPRESSION: elevated PSA with negative MRI. Given rising PSA we recommended repeat PSA.  Repeat PSA came down to 8.36.  Will plan repeat PSA in 3.  If continual downtrend or stable we can continue monitor.     PLAN:  PSA in 3 months  Return to clinic in 3 months    Thank you for referring this very pleasant patient to my clinic. If you have any questions or concerns, please do not hesitate to contact me.    Sincerely,  Chelsea Caal MD    20 minutes were spent on this patient at this visit obtaining a history, reviewing medical records, developing a  treatment plan, counseling and discussing treatment strategy with patient, coordination of care and documentation.     The 21st Century Cures Act makes medical notes available to patients in the interest of transparency.  However, please be advised that this is a medical document.  It is intended as a peer to peer communication.  It is written in medical language and may contain abbreviations or verbiage that are technical and unfamiliar.  It may appear blunt or direct.  Medical documents are intended to carry relevant information, facts as evident, and the clinical opinion of the practitioner.

## 2024-05-03 ENCOUNTER — LAB ENCOUNTER (OUTPATIENT)
Dept: LAB | Age: 59
End: 2024-05-03
Attending: UROLOGY
Payer: COMMERCIAL

## 2024-05-03 DIAGNOSIS — R97.20 ELEVATED PSA: ICD-10-CM

## 2024-05-03 LAB — PSA SERPL-MCNC: 12.35 NG/ML (ref ?–4)

## 2024-05-03 PROCEDURE — 36415 COLL VENOUS BLD VENIPUNCTURE: CPT

## 2024-05-03 PROCEDURE — 84153 ASSAY OF PSA TOTAL: CPT

## 2024-05-08 ENCOUNTER — OFFICE VISIT (OUTPATIENT)
Dept: SURGERY | Facility: CLINIC | Age: 59
End: 2024-05-08

## 2024-05-08 DIAGNOSIS — R97.20 ELEVATED PSA: Primary | ICD-10-CM

## 2024-05-08 PROCEDURE — 99213 OFFICE O/P EST LOW 20 MIN: CPT | Performed by: UROLOGY

## 2024-05-08 NOTE — PROGRESS NOTES
Chelsea Caal MD  Department of Urology  [address] [place] [address] Rd[address]  [place] [address]    T: [phone]  F: [phone]    To: Jessee Collins DO   [address] [facility] [address]    Re: Maxwell Wright   MRN: [id number]  : 1965    Dear Jessee Collins DO,    Today I had the pleasure of seeing Maxwell Wright in my clinic. As you know, Mr. Wright is a pleasant 59 year old year old male who I am seeing for followuop. Patient was last seen in this department on 2024.    Briefly, patient has had an elevated PSA since .  It has risen to as high as 22 in 2022. Urine analysis in August was negative.  He is not on any prostate medications.  He has followed with [facility] cl[facility] and Dr. Harrison in the past.  He had a prostate biopsy in 2020 which was negative, an MRI in 2020 with a PI-RADS 3 lesion and a repeat MRI in 2022 with a PI-RADS 2 lesion.     His PSA has remained \"stable\" at last visit but is now increased to 22.50.       PSA Total PSA (IM) PSA Screen   Latest Ref Rng <=4.00 ng/mL <=4.00 ng/mL <=4.00 ng/mL   2015 5.4 (H)        2016 5.2 (H)        2017 5.7 (H)        2018 4.0          4.0        2019     7.32 (H)    9/3/2020 13.00 (H)        2020 8.20 (H)        2021 18.00 (H)        2021 18.60 (H)        2021 8.83 (H)        3/17/2022 22.10 (H)        2022 11.50 (H)        2022 13.90 (H)        2023     16.00 (H)    2023 22.50 (H)  15.26 (H)         Total PSA  <=4.00 ng/mL 8.36 High       Legend:  (H) High     Last visit was to obtain a prostate MRI return to clinic after prostate MRI.  His MRI of the prostate demonstrated an 82 g prostate with no concerning findings.  He had no enlarged pelvic lymph nodes and no focal bony lesions.    Plan at last visit on 2024 was to obtain a PSA in 3 months.  He was to return to clinic in 3 months.  The reason for this was he had a downtrending PSA and a  negative MRI.  Please note that his most recent PSA is 12.35 which is up from 8.36 before but down from 22.50 previously.     PSA TOTAL PSA PSA Screen   Latest Ref Rng <=4.00 ng/mL <=4.00 ng/mL <=4.00 ng/mL   5/4/2015 5.4 (H)      5/20/2016 5.2 (H)      6/14/2017 5.7 (H)      7/24/2018 4.0       4.0      9/12/2019   7.32 (H)    9/3/2020 13.00 (H)      12/7/2020 8.20 (H)      2/16/2021 18.00 (H)      4/1/2021 18.60 (H)      7/14/2021 8.83 (H)      3/17/2022 22.10 (H)      4/21/2022 11.50 (H)      8/18/2022 13.90 (H)      8/25/2023   16.00 (H)    11/6/2023 22.50 (H)  15.26 (H)     2/1/2024  8.36 (H)     5/3/2024  12.35 (H)        Legend:  (H) High       PAST MEDICAL HISTORY:  Past Medical History:    High blood pressure    Unspecified essential hypertension        PAST SURGICAL HISTORY:  Past Surgical History:   Procedure Laterality Date    Colonoscopy N/A 6/24/2017    Procedure: COLONOSCOPY;  Surgeon: Donnie Silver MD;  Location: [facility] ENDOSCOPY         ALLERGIES:  No Known Allergies      MEDICATIONS:  Current Outpatient Medications   Medication Instructions    amLODIPine (NORVASC) 5 mg, Oral, Daily        FAMILY HISTORY:  Family History   Problem Relation Age of Onset    Hypertension Father     Heart Disorder Mother     Cancer Mother     Cancer Sister     Stroke Brother         SOCIAL HISTORY:  Social History     Socioeconomic History    Marital status:    Tobacco Use    Smoking status: Never    Smokeless tobacco: Never   Vaping Use    Vaping status: Never Used   Substance and Sexual Activity    Alcohol use: Yes     Alcohol/week: 6.0 standard drinks of alcohol     Types: 3 Cans of beer, 3 Shots of liquor per week     Comment: socially    Drug use: No          PHYSICAL EXAMINATION:  There were no vitals filed for this visit.  CONSTITUTIONAL: No apparent distress, cooperative and communicative  NEUROLOGIC: Alert and oriented   HEAD: Normocephalic, atraumatic   EYES: Sclera non-icteric   ENT: Hearing intact,  moist mucous membranes   NECK: No obvious goiter or masses   RESPIRATORY: Normal respiratory effort, Nonlabored breathing on room air  SKIN: No evident rashes   ABDOMEN: Soft, nontender, nondistended, no rebound tenderness, no guarding, no masses      REVIEW OF SYSTEMS:    A comprehensive 10-point review of systems was completed.  Pertinent positives and negatives are noted in the the HPI.       LABORATORY DATA:   PSA TOTAL PSA PSA Screen   Latest Ref Rng <=4.00 ng/mL <=4.00 ng/mL <=4.00 ng/mL   5/4/2015 5.4 (H)      5/20/2016 5.2 (H)      6/14/2017 5.7 (H)      7/24/2018 4.0       4.0      9/12/2019   7.32 (H)    9/3/2020 13.00 (H)      12/7/2020 8.20 (H)      2/16/2021 18.00 (H)      4/1/2021 18.60 (H)      7/14/2021 8.83 (H)      3/17/2022 22.10 (H)      4/21/2022 11.50 (H)      8/18/2022 13.90 (H)      8/25/2023   16.00 (H)    11/6/2023 22.50 (H)  15.26 (H)     2/1/2024  8.36 (H)     5/3/2024  12.35 (H)        Legend:  (H) High        IMAGING REVIEW:  Exam: MRI PROSTATE WW/O CONTRAST   CPT Code(s): 38384,07629,GDV10 - MRI PELVIS W/O  and  W/DYE,3D RENDER W/INTRP POSTPROCES,Gadavist 10 ML Vial     INDICATION: Elevated PSA measuring 22.50 ng/mL on 11/6/2023 which is increased from 13.90 ng/mL on 8/18/2022.     COMPARISON: Report only of a prior 3/28/2022 prostate MRI study from [facility]. The prior report describes \"no suspicious lesions within the prostate gland\".     TECHNIQUE: High-field strength 3.0 Esperanza MRI of the prostate performed before and after 10 cc Gadavist intravenous gadolinium. This exam was performed with a pelvic phase array coil with no endorectal coil utilized for this study. [employer]   multiparametric analysis software was utilized for image processing and interpretation. 3D segmentation modeling of the prostate gland was performed on an independent workstation using the [employer] software.     FINDINGS:   The prostate volume measures 82 cc (PSA density = 0.27 ng/ml2). The enlarged  prostate gland mildly indents the bladder base with moderate nonspecific bladder wall thickening. The seminal vesicles and neurovascular bundles are unremarkable.     No focal suspicious prostatic signal abnormalities are identified. There is heterogeneous enlargement of the transitional zone with well encapsulated nodules consistent with benign prostatic hyperplasia. There is mild thinning of the peripheral zone with    T2 heterogeneity consistent with bilateral areas of prostatic inflammation/scarring. No abnormal areas restricted diffusion are identified within the peripheral zone.     Extraprostatic findings: Scattered mild to moderate degenerative changes are present. There are small low signal intensity probable benign bone islands within the right posteromedial ilium (series 3, images 26-36). No other focal bone lesions are   identified. There is mild partial tearing at the hamstring tendon origins bilaterally. The muscle and tendon structures are otherwise unremarkable in signal intensity. Normal caliber distal visualized abdominal aorta and iliac vessels. Normal caliber   pelvic bowel loops with no evidence of bowel wall thickening. Moderate sigmoid colon diverticulosis is identified. A small fat-containing left inguinal hernia is again identified as previously noted. There is no evidence of free fluid or lymphadenopathy   throughout the pelvis.     IMPRESSION:     1.Prostate volume measures 82 cc. Moderate nonspecific bladder wall thickening.   2.No focal suspicious prostatic signal abnormalities. Benign prostatic hyperplasia. Bilateral areas of prostatic inflammation/scarring within the peripheral zone.   3.Mild to moderate degenerative changes. Small probable benign bone islands in the right posteromedial ilium. No other focal bone lesions.   4.Mild partial tearing at the hamstring tendon origins bilaterally.   5.Moderate sigmoid colon diverticulosis. Small fat-containing left inguinal hernia again  noted.   6.No enlarged pelvic lymph nodes.     Overall PI-RADS category 2     PI-RADS v.2.1 Assessment Categories   PI-RADS 1 - Very low (clinically significant cancer is highly unlikely to be present)   PI-RADS 2 - Low (clinically significant cancer is unlikely to be present)   PI-RADS 3 - Intermediate (the presence of clinically significant cancer is equivocal)   PI-RADS 4 - High (clinically significant cancer is likely to be present)   PI-RADS 5 - Very high (clinically significant cancer is highly likely to be present)     Interpreting Radiologist:     Jerry Raya M.D.   Electronically Signed: 12/06/2023 05:27 PM      OTHER RELEVANT DATA:   none     IMPRESSION: Elevated and was bouncing PSA to as high as 22 with negative MRI and biopsy in 2020-will plan for PSA in 6 months.  He knows to follow rules pr ior to PSA check as listed below.  I did once again discussed biopsy .    We discussed reasons for PSA elevation including enlarged prostate, \"prostate jostling\", recent ejaculation x72 hours prior to PSA collection, UTI and malignancy.          PLAN:  PSA in 6 months  Return to clinic after PSA    Thank you for referring this very pleasant patient to my clinic. If you have any questions or concerns, please do not hesitate to contact me.    Sincerely,  Chelsea Caal MD    30 minutes were spent on this patient at this visit obtaining a history, reviewing medical records, developing a treatment plan, counseling and discussing treatment strategy with patient, coordination of care and documentation.     The 21st Century Cures Act makes medical notes available to patients in the interest of transparency.  However, please be advised that this is a medical document.  It is intended as a peer to peer communication.  It is written in medical language and may contain abbreviations or verbiage that are technical and unfamiliar.  It may appear blunt or direct.  Medical documents are intended to carry relevant  information, facts as evident, and the clinical opinion of the practitioner.

## 2024-06-11 NOTE — LETTER
Date & Time: 10/1/2018, 12:57 PM  Patient: Vijay Short  Encounter Provider(s):    WILFREDO Hansen       To Whom It May Concern:    Kerrie Carrasquillo was seen and treated in our department on 10/1/2018. He should not return to work until 10/4/18.     If you EMERGENCY DEPARTMENT ENCOUNTER      NAME: Bryson Cornelius  AGE: 28 year old female  YOB: 1996  MRN: [id number]  EVALUATION DATE & TIME: No admission date for patient encounter.    PCP: Karlie Edmonds    ED PROVIDER: Dennis Stroud M.D.      Chief Complaint   Patient presents with    Flank Pain    Fever         FINAL IMPRESSION:  1.  Acute fever.  2.  Acute urinary tract infection/pyelonephritis.  3.  Possible early sepsis.    ED COURSE & MEDICAL DECISION MAKING:    3:50 PM.  I met with the patient to gather history and to perform my initial exam. We discussed plans for the ED course, including diagnostic testing and treatment. PPE worn: cloth mask.  Patient with fevers and chills since yesterday.  Nonproductive cough, vague right lower abdominal pain.  No other complaints or symptoms.  6:17 PM.  Initial lactate 2.5 and repeat lactate down to 1.2.  Blood culture sent.  EKG unremarkable.  Chest x-ray unremarkable.  Abdominal CT showing right renal edema and ureteral wall thickening consistent with pyelonephritis.  White count elevated 14.9 with 91% neutrophils.  Chemistries with bicarbonate of 20 and sugar 142.  Pregnancy is negative.  Urinalysis with multiple leukocyte esterase and white cells.  Viral testing negative.  Patient received IV fluids and double antibiotics.  Temperature down to 101.8 degrees.  [facility]  Patient in agreement with the plan.  Will page admitting service.  6:41 PM.  Hospitalist called back and is in agreement.    Pertinent Labs & Imaging studies reviewed. (See chart for details)  28 year old female presents to the Emergency Department for evaluation of fever.    At the conclusion of the encounter I discussed the results of all of the tests and the disposition. The questions were answered. The patient or family acknowledged understanding and was agreeable with the care plan.              Medical Decision Making  Obtained  supplemental history: The patient and .  Reviewed external records: Both inpatient and outpatient computer records reviewed.  Care impacted by chronic illness:N/A  Care significantly affected by social determinants of health:Access to Medical Care  Did you consider but not order tests?: Work up considered but not performed and documented in chart, if applicable  Did you interpret images independently?: Independent interpretation of ECG and images noted in documentation, when applicable.  Consultation discussion with other provider: If admitted, will discuss with the admitting service.  Pending evaluation, patient may require admission.      MEDICATIONS GIVEN IN THE EMERGENCY:  Medications   sodium chloride 0.9% BOLUS 1,000 mL (has no administration in time range)   acetaminophen (TYLENOL) tablet 650 mg (has no administration in time range)   piperacillin-tazobactam (ZOSYN) 3.375 g vial to attach to  mL bag (has no administration in time range)       NEW PRESCRIPTIONS STARTED AT TODAY'S ER VISIT  New Prescriptions    No medications on file          =================================================================    HPI    Patient information was obtained from: The patient.    Use of : N/A         Bryson Cornelius is a 28 year old female with a pertinent history of previously healthy who presents to this ED today for evaluation of fever and chills since yesterday.  No other associated symptoms.  Vague right lower abdominal pain.  Nonproductive cough.  Current temperature 104.5 degrees.    She does not identify any waxing or waning symptoms otherwise, exacerbating or alleviating features, associated symptoms except as mentioned.  She otherwise denies any pain related complaints.    REVIEW OF SYSTEMS   Review of Systems patient complaining of vague right lower abdominal pain, fever, chills.  No other associated symptoms.    PAST MEDICAL HISTORY:  Past Medical History:   Diagnosis Date     Miscarriage 05/15/2019       PAST SURGICAL HISTORY:  Past Surgical History:   Procedure Laterality Date    HC DILATION/CURETTAGE DIAG/THER NON OB N/A 2019    Procedure: DILATION AND CURETTAGE;  Surgeon: Linda Morrow MD;  Location: [facility];  Service: Gynecology    NO PAST SURGERIES      [place] LAP,DIAGNOSTIC ABDOMEN N/A 2019    Procedure: LAPAROSCOPY WITH Right Salpingectomy, removal of ectopic preganancy;  Surgeon: Linda Morrow MD;  Location: [facility] [place];  Service: Gynecology           CURRENT MEDICATIONS:    acetaminophen (TYLENOL) 325 MG tablet  docusate sodium (COLACE) 100 MG capsule  ibuprofen (ADVIL/MOTRIN) 800 MG tablet  prenatal vit calc,iron,folic (PRENATAL VITAMIN ORAL)        ALLERGIES:  No Known Allergies    FAMILY HISTORY:  No family history on file.    SOCIAL HISTORY:   Social History     Socioeconomic History    Marital status: Single   Tobacco Use    Smoking status: Former     Current packs/day: 0.00     Types: Cigarettes     Quit date: 2019     Years since quittin.1    Smokeless tobacco: Never   Substance and Sexual Activity    Alcohol use: Not Currently    Drug use: Never    Sexual activity: Yes     Partners: Male     Former smoker.  No current drugs, alcohol, or tobacco.    VITALS:  /55   Pulse (!) 146   Temp (!) 104.5  F (40.3  C) (Oral)   Resp 20   Ht 1.524 m (5')   Wt 68 kg (150 lb)   SpO2 97%   BMI 29.29 kg/m      PHYSICAL EXAM    Vital Signs:  /55   Pulse (!) 146   Temp (!) 104.5  F (40.3  C) (Oral)   Resp 20   Ht 1.524 m (5')   Wt 68 kg (150 lb)   SpO2 97%   BMI 29.29 kg/m    General:  On entering the room she is in no apparent distress.    Neck:  Neck supple with full range of motion and nontender.    Back:  Back and spine are nontender.  No costovertebral angle tenderness.    HEENT:  Oropharynx clear with moist mucous membranes.  HEENT unremarkable.    Pulmonary:  Chest clear to auscultation without rhonchi rales or  wheezing.    Cardiovascular:  Cardiac regular rate and rhythm without murmurs rubs or gallops.    Abdomen:  Abdomen soft nontender.  Vague right lower abdominal pain.  There is no rebound or guarding.    Muskuloskeletal:  She moves all 4 without any difficulty and has normal neurovascular exams.  Extremities without clubbing, cyanosis, or edema.  Legs and calves are nontender.    Neuro:  She is alert and oriented ×3 and moves all extremities symmetrically.    Psych:  Normal affect.    Skin:  Unremarkable and warm and dry.       LAB:  All pertinent labs reviewed and interpreted.  Labs Ordered and Resulted from Time of ED Arrival to Time of ED Departure   LACTIC ACID WHOLE BLOOD WITH 1X REPEAT IN 2 HR WHEN >2 - Abnormal       Result Value    Lactic Acid, Initial 2.5 (*)    ROUTINE UA WITH MICROSCOPIC REFLEX TO CULTURE - Abnormal    Color Urine Light Yellow      Appearance Urine Clear      Glucose Urine Negative      Bilirubin Urine Negative      Ketones Urine Negative      Specific Gravity Urine 1.005      Blood Urine Negative      pH Urine 6.0      Protein Albumin Urine 10 (*)     Urobilinogen Urine <2.0      Nitrite Urine Negative      Leukocyte Esterase Urine 75 Reina/uL (*)     Bacteria Urine Few (*)     RBC Urine 1      WBC Urine 23 (*)     Squamous Epithelials Urine 7 (*)    BASIC METABOLIC PANEL - Abnormal    Sodium 136      Potassium 3.6      Chloride 102      Carbon Dioxide (CO2) 20 (*)     Anion Gap 14      Urea Nitrogen 8.3      Creatinine 0.65      GFR Estimate >90      Calcium 9.1      Glucose 142 (*)    CBC WITH PLATELETS AND DIFFERENTIAL - Abnormal    WBC Count 14.9 (*)     RBC Count 4.22      Hemoglobin 12.5      Hematocrit 37.9      MCV 90      MCH 29.6      MCHC 33.0      RDW 13.3      Platelet Count 231      % Neutrophils 91      % Lymphocytes 8      % Monocytes 1      % Eosinophils 0      % Basophils 0      % Immature Granulocytes 0      NRBCs per 100 WBC 0      Absolute Neutrophils 13.5 (*)      Absolute Lymphocytes 1.1      Absolute Monocytes 0.2      Absolute Eosinophils 0.0      Absolute Basophils 0.0      Absolute Immature Granulocytes 0.0      Absolute NRBCs 0.0     HCG QUALITATIVE PREGNANCY - Normal    hCG Serum Qualitative Negative     INFLUENZA A/B, RSV, & SARS-COV2 PCR - Normal    Influenza A PCR Negative      Influenza B PCR Negative      RSV PCR Negative      SARS CoV2 PCR Negative     LACTIC ACID WHOLE BLOOD - Normal    Lactic Acid 1.2     BLOOD CULTURE   BLOOD CULTURE   URINE CULTURE       RADIOLOGY:  Reviewed all pertinent imaging. Please see official radiology report.  XR Chest 2 Views   Final Result   IMPRESSION:    No acute abnormality.      No focal pulmonary infiltrate, pleural effusion, or pneumothorax. The cardiac size and mediastinal contours appear within normal limits.       Abd/pelvis CT no contrast - Stone Protocol   Final Result   IMPRESSION:       1.  Asymmetric right renal edema and right ureteral wall thickening. Given history of chills and fever, findings suggest ascending urinary tract infection and pyelonephritis. Differential would include a recently passed collecting system stone.      2.  No hydronephrosis.                    EKG:    No previous for comparison.  Sinus tachycardia at 129 probably normal for temperature of 104.5.  Occasional PVC.  Vertical axis, no acute findings.     I have independently reviewed and interpreted the EKG(s) documented above.        Dennis Stroud M.D.  Emergency Medicine  [facility] EMERGENCY ROOM  [facility] [facility] [address] [phone]  Dept: [phone]       Dennis Stroud MD  06/11/24 1818       Dennis Stroud MD  06/11/24 [id number]

## 2024-08-21 ENCOUNTER — TELEPHONE (OUTPATIENT)
Dept: FAMILY MEDICINE CLINIC | Facility: CLINIC | Age: 59
End: 2024-08-21

## 2024-08-21 DIAGNOSIS — Z00.00 ROUTINE GENERAL MEDICAL EXAMINATION AT A HEALTH CARE FACILITY: Primary | ICD-10-CM

## 2024-08-21 NOTE — TELEPHONE ENCOUNTER
Patient wants an order for blood work so the results can be discussed at his physical on 9/17/24.

## 2024-08-30 RX ORDER — AMLODIPINE BESYLATE 5 MG/1
5 TABLET ORAL DAILY
Qty: 30 TABLET | Refills: 0 | Status: SHIPPED | OUTPATIENT
Start: 2024-08-30

## 2024-09-03 RX ORDER — AMLODIPINE BESYLATE 5 MG/1
5 TABLET ORAL DAILY
Qty: 90 TABLET | Refills: 0 | OUTPATIENT
Start: 2024-09-03

## 2024-09-13 ENCOUNTER — LAB ENCOUNTER (OUTPATIENT)
Dept: LAB | Age: 59
End: 2024-09-13
Attending: FAMILY MEDICINE
Payer: COMMERCIAL

## 2024-09-13 DIAGNOSIS — Z00.00 ROUTINE GENERAL MEDICAL EXAMINATION AT A HEALTH CARE FACILITY: ICD-10-CM

## 2024-09-13 LAB
ALBUMIN SERPL-MCNC: 4.3 G/DL (ref 3.2–4.8)
ALBUMIN/GLOB SERPL: 1.2 {RATIO} (ref 1–2)
ALP LIVER SERPL-CCNC: 52 U/L
ALT SERPL-CCNC: 13 U/L
ANION GAP SERPL CALC-SCNC: 5 MMOL/L (ref 0–18)
AST SERPL-CCNC: 33 U/L (ref ?–34)
BASOPHILS # BLD AUTO: 0.02 X10(3) UL (ref 0–0.2)
BASOPHILS NFR BLD AUTO: 0.6 %
BILIRUB SERPL-MCNC: 1.5 MG/DL (ref 0.2–1.1)
BUN BLD-MCNC: 22 MG/DL (ref 9–23)
BUN/CREAT SERPL: 17.5 (ref 10–20)
CALCIUM BLD-MCNC: 9.5 MG/DL (ref 8.7–10.4)
CHLORIDE SERPL-SCNC: 107 MMOL/L (ref 98–112)
CHOLEST SERPL-MCNC: 220 MG/DL (ref ?–200)
CO2 SERPL-SCNC: 28 MMOL/L (ref 21–32)
CREAT BLD-MCNC: 1.26 MG/DL
DEPRECATED RDW RBC AUTO: 43.2 FL (ref 35.1–46.3)
EGFRCR SERPLBLD CKD-EPI 2021: 65 ML/MIN/1.73M2 (ref 60–?)
EOSINOPHIL # BLD AUTO: 0.01 X10(3) UL (ref 0–0.7)
EOSINOPHIL NFR BLD AUTO: 0.3 %
ERYTHROCYTE [DISTWIDTH] IN BLOOD BY AUTOMATED COUNT: 13.2 % (ref 11–15)
FASTING PATIENT LIPID ANSWER: YES
FASTING STATUS PATIENT QL REPORTED: YES
GLOBULIN PLAS-MCNC: 3.5 G/DL (ref 2–3.5)
GLUCOSE BLD-MCNC: 78 MG/DL (ref 70–99)
HCT VFR BLD AUTO: 45.1 %
HDLC SERPL-MCNC: 75 MG/DL (ref 40–59)
HGB BLD-MCNC: 14.3 G/DL
IMM GRANULOCYTES # BLD AUTO: 0 X10(3) UL (ref 0–1)
IMM GRANULOCYTES NFR BLD: 0 %
LDLC SERPL CALC-MCNC: 138 MG/DL (ref ?–100)
LYMPHOCYTES # BLD AUTO: 1.24 X10(3) UL (ref 1–4)
LYMPHOCYTES NFR BLD AUTO: 38.5 %
MCH RBC QN AUTO: 28.7 PG (ref 26–34)
MCHC RBC AUTO-ENTMCNC: 31.7 G/DL (ref 31–37)
MCV RBC AUTO: 90.4 FL
MONOCYTES # BLD AUTO: 0.26 X10(3) UL (ref 0.1–1)
MONOCYTES NFR BLD AUTO: 8.1 %
NEUTROPHILS # BLD AUTO: 1.69 X10 (3) UL (ref 1.5–7.7)
NEUTROPHILS # BLD AUTO: 1.69 X10(3) UL (ref 1.5–7.7)
NEUTROPHILS NFR BLD AUTO: 52.5 %
NONHDLC SERPL-MCNC: 145 MG/DL (ref ?–130)
OSMOLALITY SERPL CALC.SUM OF ELEC: 292 MOSM/KG (ref 275–295)
PLATELET # BLD AUTO: 232 10(3)UL (ref 150–450)
POTASSIUM SERPL-SCNC: 4.2 MMOL/L (ref 3.5–5.1)
PROT SERPL-MCNC: 7.8 G/DL (ref 5.7–8.2)
RBC # BLD AUTO: 4.99 X10(6)UL
SODIUM SERPL-SCNC: 140 MMOL/L (ref 136–145)
TRIGL SERPL-MCNC: 38 MG/DL (ref 30–149)
VLDLC SERPL CALC-MCNC: 7 MG/DL (ref 0–30)
WBC # BLD AUTO: 3.2 X10(3) UL (ref 4–11)

## 2024-09-13 PROCEDURE — 36415 COLL VENOUS BLD VENIPUNCTURE: CPT

## 2024-09-13 PROCEDURE — 80053 COMPREHEN METABOLIC PANEL: CPT

## 2024-09-13 PROCEDURE — 80061 LIPID PANEL: CPT

## 2024-09-13 PROCEDURE — 85025 COMPLETE CBC W/AUTO DIFF WBC: CPT

## 2024-09-17 ENCOUNTER — OFFICE VISIT (OUTPATIENT)
Dept: FAMILY MEDICINE CLINIC | Facility: CLINIC | Age: 59
End: 2024-09-17

## 2024-09-17 VITALS
DIASTOLIC BLOOD PRESSURE: 76 MMHG | SYSTOLIC BLOOD PRESSURE: 119 MMHG | WEIGHT: 220 LBS | HEART RATE: 67 BPM | BODY MASS INDEX: 28.23 KG/M2 | HEIGHT: 74 IN | OXYGEN SATURATION: 98 %

## 2024-09-17 DIAGNOSIS — Z00.00 ROUTINE GENERAL MEDICAL EXAMINATION AT A HEALTH CARE FACILITY: Primary | ICD-10-CM

## 2024-09-17 DIAGNOSIS — E78.00 HYPERCHOLESTEROLEMIA: ICD-10-CM

## 2024-09-17 DIAGNOSIS — I10 ESSENTIAL HYPERTENSION WITH GOAL BLOOD PRESSURE LESS THAN 130/85: ICD-10-CM

## 2024-09-17 PROCEDURE — 99396 PREV VISIT EST AGE 40-64: CPT | Performed by: FAMILY MEDICINE

## 2024-09-17 PROCEDURE — 3074F SYST BP LT 130 MM HG: CPT | Performed by: FAMILY MEDICINE

## 2024-09-17 PROCEDURE — 3078F DIAST BP <80 MM HG: CPT | Performed by: FAMILY MEDICINE

## 2024-09-17 PROCEDURE — 3008F BODY MASS INDEX DOCD: CPT | Performed by: FAMILY MEDICINE

## 2024-09-17 RX ORDER — ROSUVASTATIN CALCIUM 5 MG/1
5 TABLET, COATED ORAL DAILY
Qty: 90 TABLET | Refills: 0 | Status: SHIPPED | OUTPATIENT
Start: 2024-09-17

## 2024-09-17 RX ORDER — AMLODIPINE BESYLATE 5 MG/1
5 TABLET ORAL DAILY
Qty: 90 TABLET | Refills: 3 | Status: SHIPPED | OUTPATIENT
Start: 2024-09-17

## 2024-09-17 NOTE — PROGRESS NOTES
Subjective:   Maxwell Wright is a 59 year old male who presents for Physical       History/Other:    Chief Complaint Reviewed and Verified  No Further Nursing Notes to   Review  Tobacco Reviewed  Allergies Reviewed  Medications Reviewed    Problem List Reviewed  Medical History Reviewed  Surgical History   Reviewed  Family History Reviewed  Social History Reviewed         Tobacco:  He has never smoked tobacco.    Current Outpatient Medications   Medication Sig Dispense Refill    amLODIPine 5 MG Oral Tab Take 1 tablet (5 mg total) by mouth daily. 90 tablet 3    rosuvastatin 5 MG Oral Tab Take 1 tablet (5 mg total) by mouth daily. 90 tablet 0         Review of Systems:  Review of Systems   Constitutional: Negative.    HENT: Negative.     Eyes: Negative.    Respiratory: Negative.     Cardiovascular: Negative.    Gastrointestinal: Negative.    Endocrine: Negative for polydipsia, polyphagia and polyuria.   Genitourinary: Negative.    Musculoskeletal: Negative.    Skin: Negative.         No mole changes   Allergic/Immunologic: Negative for environmental allergies.   Neurological:  Negative for dizziness, weakness, numbness and headaches.   Hematological: Negative.    Psychiatric/Behavioral:  Negative for sleep disturbance.         No anxiety or depressed feelings         Objective:   /76   Pulse 67   Ht 6' 2\" (1.88 m)   Wt 220 lb (99.8 kg)   SpO2 98%   BMI 28.25 kg/m²  Estimated body mass index is 28.25 kg/m² as calculated from the following:    Height as of this encounter: 6' 2\" (1.88 m).    Weight as of this encounter: 220 lb (99.8 kg).  Physical Exam  Vitals reviewed.   Constitutional:       Appearance: Normal appearance. He is well-developed.   HENT:      Head: Normocephalic.      Right Ear: Tympanic membrane, ear canal and external ear normal.      Left Ear: Tympanic membrane, ear canal and external ear normal.      Nose: Nose normal.   Eyes:      General: Lids are normal.      Conjunctiva/sclera:  Conjunctivae normal.      Pupils: Pupils are equal, round, and reactive to light.      Funduscopic exam:     Right eye: No hemorrhage or papilledema.         Left eye: No hemorrhage or papilledema.   Neck:      Vascular: Normal carotid pulses. No JVD.      Trachea: Trachea normal.   Cardiovascular:      Rate and Rhythm: Regular rhythm.      Pulses:           Carotid pulses are 2+ on the right side and 2+ on the left side.       Radial pulses are 2+ on the right side and 2+ on the left side.      Heart sounds: Normal heart sounds.   Pulmonary:      Breath sounds: Normal breath sounds.   Abdominal:      Tenderness: There is no abdominal tenderness.   Musculoskeletal:      Cervical back: Normal, normal range of motion and neck supple.      Thoracic back: Normal.      Lumbar back: Normal.   Lymphadenopathy:      Cervical: No cervical adenopathy.   Skin:     Comments: No suspicious lesions waist up exam   Neurological:      General: No focal deficit present.      Mental Status: He is alert and oriented to person, place, and time.      Sensory: No sensory deficit.      Deep Tendon Reflexes: Reflexes are normal and symmetric.   Psychiatric:         Mood and Affect: Mood normal. Mood is not anxious or depressed.           Assessment & Plan:   1. Routine general medical examination at a health care facility (Primary)  2. Essential hypertension with goal blood pressure less than 130/85  3. Hypercholesterolemia  -     AST (SGOT); Future; Expected date: 10/28/2024  -     ALT(SGPT); Future; Expected date: 10/28/2024  -     Lipid Panel; Future; Expected date: 10/28/2024  Other orders  -     amLODIPine Besylate; Take 1 tablet (5 mg total) by mouth daily.  Dispense: 90 tablet; Refill: 3  -     Rosuvastatin Calcium; Take 1 tablet (5 mg total) by mouth daily.  Dispense: 90 tablet; Refill: 0    Due to elevated cholesterol and elevated risk level I recommend taking a cholesterol reducing medication. Sent and have follow up lab  performed in six weeks. Renewed BP med. Exercises regularly already.       No follow-ups on file.    Jessee Collins DO, 9/17/2024, 4:19 PM

## 2024-12-24 RX ORDER — ROSUVASTATIN CALCIUM 5 MG/1
5 TABLET, COATED ORAL DAILY
Qty: 90 TABLET | Refills: 3 | Status: SHIPPED | OUTPATIENT
Start: 2024-12-24

## 2024-12-24 NOTE — TELEPHONE ENCOUNTER
Refill passed per [facility] protocol.    Requested Prescriptions   Pending Prescriptions Disp Refills    ROSUVASTATIN 5 MG Oral Tab [Pharmacy Med Name: ROSUVASTATIN 5MG TABLETS] 90 tablet 0     Sig: TAKE 1 TABLET(5 MG) BY MOUTH DAILY       Cholesterol Medication Protocol Passed - 12/24/2024 12:35 PM        Passed - ALT < 80     Lab Results   Component Value Date    ALT 13 09/13/2024             Passed - ALT resulted within past year        Passed - Lipid panel within past 12 months     Lab Results   Component Value Date    CHOLEST 220 (H) 09/13/2024    TRIG 38 09/13/2024    HDL 75 (H) 09/13/2024     (H) 09/13/2024    VLDL 7 09/13/2024    NONHDLC 145 (H) 09/13/2024             Passed - In person appointment or virtual visit in the past 12 mos or appointment in next 3 mos     Recent Outpatient Visits              3 months ago Routine general medical examination at a health care facility    [facility] Jessee Collins DO    Office Visit    7 months ago [facility]    [facility]    10 months ago Elevated PSA    [facility] Chelsea Caal MD    Office Visit    1 year ago Elevated PSA    [facility] Chelsea Caal MD    Office Visit    1 year ago Elevated PSA    [facility]    [facility]          Future Appointments         Provider Department Appt Notes    In 3 weeks [facility] 6 month PSA                         Recent Outpatient Visits              3 months ago Routine general medical examination at a health care facility    [facility] Jessee Collins DO    Office Visit    7 months ago Elevated PSA    [facility]    [facility]    10 months ago [facility] PSA    [facility] [place]    [facility]    [facility]    [facility]    Office Visit            Future Appointments         Provider Department Appt Notes    In 3 weeks Chelsea Caal MD [facility] [place] 6 month PSA

## 2025-01-17 ENCOUNTER — LAB ENCOUNTER (OUTPATIENT)
Dept: LAB | Age: 60
End: 2025-01-17
Attending: UROLOGY
Payer: COMMERCIAL

## 2025-01-17 DIAGNOSIS — R97.20 ELEVATED PSA: ICD-10-CM

## 2025-01-17 LAB — PSA SERPL-MCNC: 12.46 NG/ML (ref ?–4)

## 2025-01-17 PROCEDURE — 84153 ASSAY OF PSA TOTAL: CPT

## 2025-01-17 PROCEDURE — 36415 COLL VENOUS BLD VENIPUNCTURE: CPT

## 2025-01-20 ENCOUNTER — OFFICE VISIT (OUTPATIENT)
Dept: SURGERY | Facility: CLINIC | Age: 60
End: 2025-01-20
Payer: COMMERCIAL

## 2025-01-20 DIAGNOSIS — R97.20 ELEVATED PSA: Primary | ICD-10-CM

## 2025-01-20 PROCEDURE — 99213 OFFICE O/P EST LOW 20 MIN: CPT | Performed by: UROLOGY

## 2025-01-20 NOTE — PROGRESS NOTES
Chelsea Caal MD  Department of Urology  [address] [place] [address] Rd[address]  [place] [address]    T: [phone]  F: [phone]    To: Jessee Collins DO   [address] [facility] [address]    Re: Maxwell Wright   MRN: [id number]  : 1965    Dear Jessee Collins DO,    Today I had the pleasure of seeing Maxwell Wright in my clinic. As you know, Mr. Wright is a pleasant 60 year old year old male who I am seeing for followup. Patient was last seen in this department on 2024.    Briefly,  patient has had an elevated PSA since .  It has risen to as high as 22 in 2022. Urine analysis in August was negative.  He is not on any prostate medications.  He has followed with Nas claire and Dr. Harrison in the past.  He had a prostate biopsy in 2020 which was negative, an MRI in 2020 with a PI-RADS 3 lesion and a repeat MRI in 2022 with a PI-RADS 2 lesion.     His PSA has remained \"stable\" at last visit but is now increased to 22.50.       PSA Total PSA (IM) PSA Screen   Latest Ref Rng <=4.00 ng/mL <=4.00 ng/mL <=4.00 ng/mL   2015 5.4 (H)        2016 5.2 (H)        2017 5.7 (H)        2018 4.0          4.0        2019     7.32 (H)    9/3/2020 13.00 (H)        2020 8.20 (H)        2021 18.00 (H)        2021 18.60 (H)        2021 8.83 (H)        3/17/2022 22.10 (H)        2022 11.50 (H)        2022 13.90 (H)        2023     16.00 (H)    2023 22.50 (H)  15.26 (H)         Total PSA  <=4.00 ng/mL 8.36 High       Legend:  (H) High     Last visit was to obtain a prostate MRI return to clinic after prostate MRI.  His MRI of the prostate demonstrated an 82 g prostate with no concerning findings.  He had no enlarged pelvic lymph nodes and no focal bony lesions.     Plan at visit on May 8, 2024 was to obtain a repeat PSA.  I did discuss biopsy.  He obtained a repeat PSA that was as high as 12.46 but downtrending from prior  values.    Component      Latest Ref Rng 11/6/2023 2/1/2024 5/3/2024 1/17/2025   TOTAL PSA      <=4.00 ng/mL 15.26 (H)  8.36 (H)  12.35 (H)  12.46 (H)       Legend:  (H) High       PAST MEDICAL HISTORY:  Past Medical History:    High blood pressure    Unspecified essential hypertension        PAST SURGICAL HISTORY:  Past Surgical History:   Procedure Laterality Date    Colonoscopy N/A 6/24/2017    Procedure: COLONOSCOPY;  Surgeon: Donnie Silver MD;  Location: [facility] ENDOSCOPY         ALLERGIES:  Allergies[1]      MEDICATIONS:  Current Outpatient Medications   Medication Instructions    amLODIPine (NORVASC) 5 mg, Oral, Daily    rosuvastatin (CRESTOR) 5 mg, Oral, Daily        FAMILY HISTORY:  Family History   Problem Relation Age of Onset    Hypertension Father     Heart Disorder Mother     Cancer Mother     Cancer Sister     Stroke Brother         SOCIAL HISTORY:  Social History     Socioeconomic History    Marital status:    Tobacco Use    Smoking status: Never    Smokeless tobacco: Never   Vaping Use    Vaping status: Never Used   Substance and Sexual Activity    Alcohol use: Yes     Alcohol/week: 6.0 standard drinks of alcohol     Types: 3 Cans of beer, 3 Shots of liquor per week     Comment: socially    Drug use: No          PHYSICAL EXAMINATION:  There were no vitals filed for this visit.  CONSTITUTIONAL: No apparent distress, cooperative and communicative  NEUROLOGIC: Alert and oriented   HEAD: Normocephalic, atraumatic   EYES: Sclera non-icteric   ENT: Hearing intact, moist mucous membranes   NECK: No obvious goiter or masses   RESPIRATORY: Normal respiratory effort, Nonlabored breathing on room air  SKIN: No evident rashes   ABDOMEN: Soft, nontender, nondistended, no rebound tenderness, no guarding, no masses    DIGITAL RECTAL EXAM: firmness on the right hemiprostate, but no obvious nodules, good anal sphincter tone    REVIEW OF SYSTEMS:    A comprehensive 10-point review of systems was completed.   Pertinent positives and negatives are noted in the the HPI.       LABORATORY DATA:  Component      Latest Ref Rng 11/6/2023 2/1/2024 5/3/2024 1/17/2025   TOTAL PSA      <=4.00 ng/mL 15.26 (H)  8.36 (H)  12.35 (H)  12.46 (H)       Legend:  (H) High        IMAGING REVIEW:  Exam: MRI PROSTATE WW/O CONTRAST  CPT Code(s): 19291,93627,GDV10 - MRI PELVIS W/O  and  W/DYE,3D RENDER W/INTRP POSTPROCES,Gadavist 10 ML Vial    INDICATION: Elevated PSA measuring 22.50 ng/mL on 11/6/2023 which is increased from 13.90 ng/mL on 8/18/2022.    COMPARISON: Report only of a prior 3/28/2022 prostate MRI study from [facility]. The prior report describes \"no suspicious lesions within the prostate gland\".    TECHNIQUE: High-field strength 3.0 Esperanza MRI of the prostate performed before and after 10 cc Gadavist intravenous gadolinium. This exam was performed with a pelvic phase array coil with no endorectal coil utilized for this study. [employer]  multiparametric analysis software was utilized for image processing and interpretation. 3D segmentation modeling of the prostate gland was performed on an independent workstation using the [employer] software.    FINDINGS:  The prostate volume measures 82 cc (PSA density = 0.27 ng/ml2). The enlarged prostate gland mildly indents the bladder base with moderate nonspecific bladder wall thickening. The seminal vesicles and neurovascular bundles are unremarkable.    No focal suspicious prostatic signal abnormalities are identified. There is heterogeneous enlargement of the transitional zone with well encapsulated nodules consistent with benign prostatic hyperplasia. There is mild thinning of the peripheral zone with   T2 heterogeneity consistent with bilateral areas of prostatic inflammation/scarring. No abnormal areas restricted diffusion are identified within the peripheral zone.    Extraprostatic findings: Scattered mild to moderate degenerative changes are present. There are small low  signal intensity probable benign bone islands within the right posteromedial ilium (series 3, images 26-36). No other focal bone lesions are  identified. There is mild partial tearing at the hamstring tendon origins bilaterally. The muscle and tendon structures are otherwise unremarkable in signal intensity. Normal caliber distal visualized abdominal aorta and iliac vessels. Normal caliber  pelvic bowel loops with no evidence of bowel wall thickening. Moderate sigmoid colon diverticulosis is identified. A small fat-containing left inguinal hernia is again identified as previously noted. There is no evidence of free fluid or lymphadenopathy  throughout the pelvis.    IMPRESSION:    1.Prostate volume measures 82 cc. Moderate nonspecific bladder wall thickening.  2.No focal suspicious prostatic signal abnormalities. Benign prostatic hyperplasia. Bilateral areas of prostatic inflammation/scarring within the peripheral zone.  3.Mild to moderate degenerative changes. Small probable benign bone islands in the right posteromedial ilium. No other focal bone lesions.  4.Mild partial tearing at the hamstring tendon origins bilaterally.  5.Moderate sigmoid colon diverticulosis. Small fat-containing left inguinal hernia again noted.  6.No enlarged pelvic lymph nodes.    Overall PI-RADS category 2    PI-RADS v.2.1 Assessment Categories  PI-RADS 1 - Very low (clinically significant cancer is highly unlikely to be present)  PI-RADS 2 - Low (clinically significant cancer is unlikely to be present)  PI-RADS 3 - Intermediate (the presence of clinically significant cancer is equivocal)  PI-RADS 4 - High (clinically significant cancer is likely to be present)  PI-RADS 5 - Very high (clinically significant cancer is highly likely to be present)    Interpreting Radiologist:    Jerry Ryaa M.D.  Electronically Signed: 12/06/2023 05:27 PM  Result Information       OTHER RELEVANT DATA:   none     IMPRESSION: Elevated and was bouncing  PSA to as high as 22 with negative MRI and biopsy in 2020. JD now with some firmness but no nodules. Will plan for PSA in 6 months.  He knows to follow rules prior to PSA check as listed below.  I did once again offer MRI/ biopsy .     We discussed reasons for PSA elevation including enlarged prostate, \"prostate jostling\", recent ejaculation x72 hours prior to PSA collection, UTI and malignancy.      PLAN:  PSA 6 months  May need to consider repeat Mri followed by biopsy pending results  RTC after PSA    Thank you for referring this very pleasant patient to my clinic. If you have any questions or concerns, please do not hesitate to contact me.    Sincerely,  Chelsea Caal MD    20 minutes were spent on this patient at this visit obtaining a history, reviewing medical records, developing a treatment plan, counseling and discussing treatment strategy with patient, coordination of care and documentation.     The 21st Century Cures Act makes medical notes available to patients in the interest of transparency.  However, please be advised that this is a medical document.  It is intended as a peer to peer communication.  It is written in medical language and may contain abbreviations or verbiage that are technical and unfamiliar.  It may appear blunt or direct.  Medical documents are intended to carry relevant information, facts as evident, and the clinical opinion of the practitioner.         [1] No Known Allergies

## 2025-07-18 ENCOUNTER — LAB ENCOUNTER (OUTPATIENT)
Dept: LAB | Age: 60
End: 2025-07-18
Attending: FAMILY MEDICINE
Payer: COMMERCIAL

## 2025-07-18 DIAGNOSIS — E78.00 HYPERCHOLESTEROLEMIA: ICD-10-CM

## 2025-07-18 DIAGNOSIS — R97.20 ELEVATED PSA: ICD-10-CM

## 2025-07-18 LAB
ALT SERPL-CCNC: 9 U/L (ref 10–49)
AST SERPL-CCNC: 26 U/L (ref ?–34)
CHOLEST SERPL-MCNC: 212 MG/DL (ref ?–200)
FASTING PATIENT LIPID ANSWER: YES
HDLC SERPL-MCNC: 87 MG/DL (ref 40–59)
LDLC SERPL CALC-MCNC: 111 MG/DL (ref ?–100)
NONHDLC SERPL-MCNC: 125 MG/DL (ref ?–130)
PSA SERPL-MCNC: 23.24 NG/ML (ref ?–4)
TRIGL SERPL-MCNC: 80 MG/DL (ref 30–149)
VLDLC SERPL CALC-MCNC: 14 MG/DL (ref 0–30)

## 2025-07-18 PROCEDURE — 84153 ASSAY OF PSA TOTAL: CPT

## 2025-07-18 PROCEDURE — 36415 COLL VENOUS BLD VENIPUNCTURE: CPT

## 2025-07-18 PROCEDURE — 80061 LIPID PANEL: CPT

## 2025-07-18 PROCEDURE — 84450 TRANSFERASE (AST) (SGOT): CPT

## 2025-07-18 PROCEDURE — 84460 ALANINE AMINO (ALT) (SGPT): CPT

## 2025-07-19 ENCOUNTER — RESULTS FOLLOW-UP (OUTPATIENT)
Dept: SURGERY | Facility: CLINIC | Age: 60
End: 2025-07-19

## 2025-07-21 NOTE — TELEPHONE ENCOUNTER
----- Message from Shan Obrien sent at 7/19/2025  9:58 PM CDT -----  Last MyChart login 9/10/2024.  Please let the patient know that we have received the results from his PSA testing on 7/18/2025.  He should follow-up as scheduled with Dr. Caal on 7/23/2025 to go   over the results.    Future Appointments   Date Time Provider Department Center   7/23/2025  3:00 PM Chelsea Caal MD [facility]       ----- Message -----  From: Lab, Background User  Sent: 7/18/2025   7:49 PM CDT  To: Chelsea Caal MD

## 2025-07-21 NOTE — TELEPHONE ENCOUNTER
I s/w pt and gave him the PSA results and instructions as stated below and pt verbalized understanding and compliance.

## 2025-07-23 ENCOUNTER — OFFICE VISIT (OUTPATIENT)
Dept: SURGERY | Facility: CLINIC | Age: 60
End: 2025-07-23

## 2025-07-23 DIAGNOSIS — R97.20 ELEVATED PSA: Primary | ICD-10-CM

## 2025-07-23 PROCEDURE — 99213 OFFICE O/P EST LOW 20 MIN: CPT | Performed by: UROLOGY

## 2025-07-23 PROCEDURE — G2211 COMPLEX E/M VISIT ADD ON: HCPCS | Performed by: UROLOGY

## 2025-07-23 NOTE — PROGRESS NOTES
Chelsea Caal MD  Department of Urology  [address] [place] [address] Rd[address]  [place] [address]    T: [phone]  F: [phone]    To: Jessee Collins DO   [address] [facility] [address]    Re: Maxwell Wright   MRN: [id number]  : 1965    Dear Jessee Collins DO,    Today I had the pleasure of seeing Maxwell Wright in my clinic. As you know, Mr. Wright is a pleasant 60 year old year old male who I am seeing for elevated PSA. Patient was last seen in this department on 2025.    Briefly, patient has seen me for elevated PSA in the past.  He has a PSA elevated since .  It has risen as high as 22 in 2022.  He has seen my partner Dr. Harrison in the past.  Please note that he had a biopsy in 2020 which was negative, and MRI in  with a PI-RADS 3 lesion and a repeat MRI in 2022 with a PI-RADS 2 lesion.  His PSA did increase to 22 and we therefore recommended an MRI.  An MRI in  demonstrated no significant PI-RADS lesions.  We last saw him on 2025 and plan was for a PSA in 6 months and follow-up after the PSA.     PSA TOTAL PSA PSA Screen   Latest Ref Rng <=4.00 ng/mL <=4.00 ng/mL <=4.00 ng/mL   2015 5.4 (H)      2016 5.2 (H)      2017 5.7 (H)      2018 4.0       4.0      2019   7.32 (H)    9/3/2020 13.00 (H)      2020 8.20 (H)      2021 18.00 (H)      2021 18.60 (H)      2021 8.83 (H)      3/17/2022 22.10 (H)      2022 11.50 (H)      2022 13.90 (H)      2023   16.00 (H)    2023 22.50 (H)  15.26 (H)     2024  8.36 (H)     5/3/2024  12.35 (H)     2025  12.46 (H)     2025  23.24 (H)        Legend:  (H) High       PAST MEDICAL HISTORY:  Past Medical History[1]     PAST SURGICAL HISTORY:  Past Surgical History[2]      ALLERGIES:  Allergies[3]      MEDICATIONS:  Current Outpatient Medications   Medication Instructions    amLODIPine (NORVASC) 5 mg, Oral, Daily    rosuvastatin (CRESTOR) 5 mg, Oral, Daily         FAMILY HISTORY:  Family History[4]     SOCIAL HISTORY:  Short Social Hx on File[5]       PHYSICAL EXAMINATION:  There were no vitals filed for this visit.  CONSTITUTIONAL: No apparent distress, cooperative and communicative  NEUROLOGIC: Alert and oriented   HEAD: Normocephalic, atraumatic   EYES: Sclera non-icteric   ENT: Hearing intact, moist mucous membranes   NECK: No obvious goiter or masses   RESPIRATORY: Normal respiratory effort, Nonlabored breathing on room air  SKIN: No evident rashes   ABDOMEN: no obvious masses, no obvious distension  DIGITAL RECTAL EXAM: no nodules, soft, nontender, good anal sphincter tone    REVIEW OF SYSTEMS:    A comprehensive 10-point review of systems was completed.  Pertinent positives and negatives are noted in the the HPI.       LABORATORY DATA:   PSA TOTAL PSA PSA Screen   Latest Ref Rng <=4.00 ng/mL <=4.00 ng/mL <=4.00 ng/mL   5/4/2015 5.4 (H)      5/20/2016 5.2 (H)      6/14/2017 5.7 (H)      7/24/2018 4.0       4.0      9/12/2019   7.32 (H)    9/3/2020 13.00 (H)      12/7/2020 8.20 (H)      2/16/2021 18.00 (H)      4/1/2021 18.60 (H)      7/14/2021 8.83 (H)      3/17/2022 22.10 (H)      4/21/2022 11.50 (H)      8/18/2022 13.90 (H)      8/25/2023   16.00 (H)    11/6/2023 22.50 (H)  15.26 (H)     2/1/2024  8.36 (H)     5/3/2024  12.35 (H)     1/17/2025  12.46 (H)     7/18/2025  23.24 (H)        Legend:  (H) High        IMAGING REVIEW:  Exam: MRI PROSTATE WW/O CONTRAST  CPT Code(s): 93034,57925,GDV10 - MRI PELVIS W/O  and  W/DYE,3D RENDER W/INTRP POSTPROCES,Gadavist 10 ML Vial    INDICATION: Elevated PSA measuring 22.50 ng/mL on 11/6/2023 which is increased from 13.90 ng/mL on 8/18/2022.    COMPARISON: Report only of a prior 3/28/2022 prostate MRI study from [facility]. The prior report describes \"no suspicious lesions within the prostate gland\".    TECHNIQUE: High-field strength 3.0 Esperanza MRI of the prostate performed before and after 10 cc Gadavist intravenous  gadolinium. This exam was performed with a pelvic phase array coil with no endorectal coil utilized for this study. [employer]  multiparametric analysis software was utilized for image processing and interpretation. 3D segmentation modeling of the prostate gland was performed on an independent workstation using the [employer] software.    FINDINGS:  The prostate volume measures 82 cc (PSA density = 0.27 ng/ml2). The enlarged prostate gland mildly indents the bladder base with moderate nonspecific bladder wall thickening. The seminal vesicles and neurovascular bundles are unremarkable.    No focal suspicious prostatic signal abnormalities are identified. There is heterogeneous enlargement of the transitional zone with well encapsulated nodules consistent with benign prostatic hyperplasia. There is mild thinning of the peripheral zone with   T2 heterogeneity consistent with bilateral areas of prostatic inflammation/scarring. No abnormal areas restricted diffusion are identified within the peripheral zone.    Extraprostatic findings: Scattered mild to moderate degenerative changes are present. There are small low signal intensity probable benign bone islands within the right posteromedial ilium (series 3, images 26-36). No other focal bone lesions are  identified. There is mild partial tearing at the hamstring tendon origins bilaterally. The muscle and tendon structures are otherwise unremarkable in signal intensity. Normal caliber distal visualized abdominal aorta and iliac vessels. Normal caliber  pelvic bowel loops with no evidence of bowel wall thickening. Moderate sigmoid colon diverticulosis is identified. A small fat-containing left inguinal hernia is again identified as previously noted. There is no evidence of free fluid or lymphadenopathy  throughout the pelvis.    IMPRESSION:    1.Prostate volume measures 82 cc. Moderate nonspecific bladder wall thickening.  2.No focal suspicious prostatic signal  abnormalities. Benign prostatic hyperplasia. Bilateral areas of prostatic inflammation/scarring within the peripheral zone.  3.Mild to moderate degenerative changes. Small probable benign bone islands in the right posteromedial ilium. No other focal bone lesions.  4.Mild partial tearing at the hamstring tendon origins bilaterally.  5.Moderate sigmoid colon diverticulosis. Small fat-containing left inguinal hernia again noted.  6.No enlarged pelvic lymph nodes.    Overall PI-RADS category 2    PI-RADS v.2.1 Assessment Categories  PI-RADS 1 - Very low (clinically significant cancer is highly unlikely to be present)  PI-RADS 2 - Low (clinically significant cancer is unlikely to be present)  PI-RADS 3 - Intermediate (the presence of clinically significant cancer is equivocal)  PI-RADS 4 - High (clinically significant cancer is likely to be present)  PI-RADS 5 - Very high (clinically significant cancer is highly likely to be present)    Interpreting Radiologist:    Jerry Raya M.D.  Electronically Signed: 12/06/2023 05:27 PM  Result Information       OTHER RELEVANT DATA:   none     IMPRESSION: Elevated PSA on multiple occasions with a negative biopsy in 2020 and multiple negative MRIs-recommended/offered biopsy versus continued check of PSA.      We discussed reasons for PSA elevation including enlarged prostate, \"prostate jostling\", recent ejaculation x72 hours prior to PSA collection, UTI and malignancy.     Discussed risks of biopsy which include bleeding (hematospermia, hematochezia and hematuria), infection, sepsis, temporary erectile dysfunction, pelvic pain and possible death from infectious complications.     He knows that MRI is not a direct substitute for biopsy and even if negative does not rule out malignancy. It would help with targeting lesions during biopsy or potentially delaying biopsy pending psas. IT is a decision making tool.       PLAN:  PSA in 6 months  Return to clinic after PSA    Thank you  for referring this very pleasant patient to my clinic. If you have any questions or concerns, please do not hesitate to contact me.    Sincerely,  Chelsea Caal MD    30 minutes were spent on this patient at this visit obtaining a history, reviewing medical records, developing a treatment plan, counseling and discussing treatment strategy with patient, coordination of care and documentation.     The 21st Century Cures Act makes medical notes available to patients in the interest of transparency.  However, please be advised that this is a medical document.  It is intended as a peer to peer communication.  It is written in medical language and may contain abbreviations or verbiage that are technical and unfamiliar.  It may appear blunt or direct.  Medical documents are intended to carry relevant information, facts as evident, and the clinical opinion of the practitioner.         [1]   Past Medical History:   High blood pressure    Unspecified essential hypertension   [2]   Past Surgical History:  Procedure Laterality Date    Colonoscopy N/A 6/24/2017    Procedure: COLONOSCOPY;  Surgeon: Donnie Silver MD;  Location: [facility] ENDOSCOPY   [3] No Known Allergies  [4]   Family History  Problem Relation Age of Onset    Hypertension Father     Heart Disorder Mother     Cancer Mother     Cancer Sister     Stroke Brother    [5]   Social History  Socioeconomic History    Marital status:    Tobacco Use    Smoking status: Never    Smokeless tobacco: Never   Vaping Use    Vaping status: Never Used   Substance and Sexual Activity    Alcohol use: Yes     Alcohol/week: 6.0 standard drinks of alcohol     Types: 3 Cans of beer, 3 Shots of liquor per week     Comment: socially    Drug use: No

## 2025-08-25 ENCOUNTER — TELEPHONE (OUTPATIENT)
Dept: FAMILY MEDICINE CLINIC | Facility: CLINIC | Age: 60
End: 2025-08-25

## 2025-08-25 DIAGNOSIS — Z00.00 ROUTINE GENERAL MEDICAL EXAMINATION AT A HEALTH CARE FACILITY: Primary | ICD-10-CM

## (undated) DIAGNOSIS — R97.20 ELEVATED PSA: Primary | ICD-10-CM

## (undated) DEVICE — Device: Brand: DEFENDO AIR/WATER/SUCTION AND BIOPSY VALVE

## (undated) DEVICE — ENDOSCOPY PACK - LOWER: Brand: MEDLINE INDUSTRIES, INC.

## (undated) NOTE — IP AVS SNAPSHOT
[facility]    [address], [place] ~ [phone]                Discharge Summary   6/24/2017    Jocelynn Goods           Admission Information     Date & Time  6/24/2017 Provider  Jackalyn Duane, MD Department  E 41 (06/14/17)  51 (06/14/17)  5 (06/14/17)  2 (06/14/17)  1 -- (06/14/17)  1.3 (L) (06/14/17)  1.7 (06/14/17)  0.2 (06/14/17)  0.0 (06/14/17)  0.0      Metabolic Lab Results  (Last result in the past 90 days)    HgbA1C Glucose BUN Creatinine Calcium Alkali [employer] will allow you to access patient instructions from your recent visit,  view other health information, and more. To sign up or find more information, go to [email or web address] and click on the Sign Up Now link in the [employer] box.      Enter

## (undated) NOTE — MR AVS SNAPSHOT
[facility] SPECIALTY [facility] - [place] [address] [place]  [id number] [phone]               Thank you for choosing us for your health care visit with Virginia Lu DO.   We are glad to serve you and happy to provide you with this summary of This list is accurate as of: 6/10/17 11:36 AM.  Always use your most recent med list.                lisinopril 30 MG Tabs   Take 1 tablet (30 mg total) by mouth daily.    Commonly known as:  PRINIVIL,ZESTRIL           Na Sulfate-K Sulfate-Mg Sulf 17.5-3.13

## (undated) NOTE — LETTER
01/21/21        Jackson Laird  [address] [place] [id number]      Dear Merline Curt records indicate that you have outstanding lab work and or testing that was ordered for you and has not yet been completed:  Orders Placed This Encounter      Comp Metab

## (undated) NOTE — LETTER
[address], [place]  Authorization for Invasive Procedures  Date: 02/06/23           Time: 0745    1. I hereby authorize Ayo cMcarty, my physician and his/her assistants (if applicable), which may include medical students, residents, and/or fellows, to perform the following surgical operation/ procedure and administer such anesthesia as may be determined necessary by my physician:  Operation/Procedure name (s)  Cardiac Catheterization, Left Ventricular Cineangiography, Bilateral Selective Coronary Angiography and/or Right Heart Catheterization; possible Percutaneous Transluminal Coronary Angioplasty, Coronary Atherectomy, Coronary Stent, Intracoronary Thrombolytic therapy, Antiplatelet therapy and/or Intravascular Ultrasound on [address]   2. I recognize that during the surgical operation/procedure, unforeseen conditions may necessitate additional or different procedures than those listed above. I, therefore, further authorize and request that the above-named surgeon, assistants, or designees perform such procedures as are, in their judgment, necessary and desirable. 3.   My surgeon/physician has discussed prior to my surgery the potential benefits, risks and side effects of this procedure; the likelihood of achieving goals; and potential problems that might occur during recuperation. They also discussed reasonable alternatives to the procedure, including risks, benefits, and side effects related to the alternatives and risks related to not receiving this procedure. I have had all my questions answered and I acknowledge that no guarantee has been made as to the result that may be obtained. 4.   Should the need arise during my operation/procedure, which includes change of level of care prior to discharge, I also consent to the administration of blood and/or blood products.   Further, I understand that despite careful testing and screening of blood or blood products by collecting agencies, I may still be subject to ill effects as a result of receiving a blood transfusion and/or blood products. The following are some, but not all, of the potential risks that can occur: fever and allergic reactions, hemolytic reactions, transmission of diseases such as Hepatitis, AIDS and Cytomegalovirus (CMV) and fluid overload. In the event that I wish to have an autologous transfusion of my own blood, or a directed donor transfusion, I will discuss this with my physician. Check only if Refusing Blood or Blood Products  I understand refusal of blood or blood products as deemed necessary by my physician may have serious consequences to my condition to include possible death. I hereby assume responsibility for my refusal and release the hospital, its personnel, and my physicians from any responsibility for the consequences of my refusal.          o  Refuse      5. I authorize the use of any specimen, organs, tissues, body parts or foreign objects that may be removed from my body during the operation/procedure for diagnosis, research or teaching purposes and their subsequent disposal by hospital authorities. I also authorize the release of specimen test results and/or written reports to my treating physician on the hospital medical staff or other referring or consulting physicians involved in my care, at the discretion of the Pathologist or my treating physician. 6.   I consent to the photographing or videotaping of the operations or procedures to be performed, including appropriate portions of my body for medical, scientific, or educational purposes, provided my identity is not revealed by the pictures or by descriptive texts accompanying them. If the procedure has been photographed/videotaped, the surgeon will obtain the original picture, image, videotape or CD.   The hospital will not be responsible for storage, release or maintenance of the picture, image, tape or CD.    7.   I consent to the presence of a  or observers in the operating room as deemed necessary by my physician or their designees. 8.   I recognize that in the event my procedure results in extended X-Ray/fluoroscopy time, I may develop a skin reaction. 9. If I have a Do Not Attempt Resuscitation (DNAR) order in place, that status will be suspended while in the operating room, procedural suite, and during the recovery period unless otherwise explicitly stated by me (or a person authorized to consent on my behalf). The surgeon or my attending physician will determine when the applicable recovery period ends for purposes of reinstating the DNAR order. 10. Patients having a sterilization procedure: I understand that if the procedure is successful the results will be permanent and it will therefore be impossible for me to inseminate, conceive, or bear children. I also understand that the procedure is intended to result in sterility, although the result has not been guaranteed. 11. I acknowledge that my physician has explained sedation/analgesia administration to me including the risk and benefits I consent to the administration of sedation/analgesia as may be necessary or desirable in the judgment of my physician.     I CERTIFY THAT I HAVE READ AND FULLY UNDERSTAND THE ABOVE CONSENT TO OPERATION and/or OTHER PROCEDURE.        ____________________________________       _________________________________      ______________________________  Signature of Patient         Signature of Responsible Person        Printed Name of Responsible Person    ____________________________________      _________________________________      ______________________________       Signature of Witness          Relationship to Patient                       Date                                       Time  Patient Name: Savannah Coombs     : 1965                 Printed: 2023      Medical Record #: [id number] Page 1 of 2         STATEMENT OF PHYSICIAN My signature below affirms that prior to the time of the procedure; I have explained to the patient and/or his/her legal representative, the risks and benefits involved in the proposed treatment and any reasonable alternative to the proposed treatment. I have also explained the risks and benefits involved in refusal of the proposed treatment and alternatives to the proposed treatment and have answered the patient's questions.  If I have a significant financial interest in a co-management agreement or a significant financial interest in any product or implant, or other significant relationship used in this procedure/surgery, I have disclosed this and had a discussion with my patient.     _______________________________________________________________ _____________________________  Zaira Chen of Physician)                                                                                         (Date)                                   (Time)  Patient Name: Savannah Coombs     : 1965                 Printed: 2023      Medical Record #: [id number]                      Page 2 of 2

## (undated) NOTE — LETTER
10/3/2018              Rohith Wright         [address] [address]          Raven Su should be excused from work from 10/3 until 10/10/2018. If you have any questions please contact me.        Sincerely,    Wojciech Wolfe MD  [facility]

## (undated) NOTE — LETTER
1/24/2020          To Whom It May Concern:    Rico Sandra was seen in my office today, January 24, 2020 for an office procedure. Please excuse Cy Thurman. He may return back to work on Wednesday, January 29, 2020.   Activity is restricted as follows: None    If

## (undated) NOTE — MR AVS SNAPSHOT
[facility]  [address] [place] [address]  [phone]               Thank you for choosing us for your health care visit with Mary Gallardo. Pankaj Maldonado MD.  We are glad to serve you and happy to provide you with this summary of your visit. [employer] [address] [place], [place] [address] for making healthy food choices  -   Enjoy your food, but eat less. Fully enjoy your food when eating. Don’t eat while distracted and slow down. Avoid over sized portions. Don’t eat while when you’re bored.

## (undated) NOTE — LETTER
04/22/21        [address] [place] [address]      Dear Karissa Thapa records indicate that you have outstanding lab work and or testing that was ordered for you and has not yet been completed:  Orders Placed This Encounter      Comp Metab

## (undated) NOTE — LETTER
07/16/18        [facility], [id number] [address]      Dear Genaro Camacho records indicate that you have outstanding lab work and or testing that was ordered for you and has not yet been completed:          CBC With Differential With Platelet